# Patient Record
Sex: FEMALE | Race: WHITE | Employment: OTHER | URBAN - METROPOLITAN AREA
[De-identification: names, ages, dates, MRNs, and addresses within clinical notes are randomized per-mention and may not be internally consistent; named-entity substitution may affect disease eponyms.]

---

## 2017-01-03 ENCOUNTER — TELEPHONE (OUTPATIENT)
Dept: ENDOCRINOLOGY | Age: 77
End: 2017-01-03

## 2017-03-13 ENCOUNTER — TELEPHONE (OUTPATIENT)
Dept: ENDOCRINOLOGY | Age: 77
End: 2017-03-13

## 2017-03-13 DIAGNOSIS — R73.01 IFG (IMPAIRED FASTING GLUCOSE): Primary | ICD-10-CM

## 2017-03-13 DIAGNOSIS — Z86.39 HISTORY OF THYROIDITIS: ICD-10-CM

## 2017-03-13 DIAGNOSIS — E61.1 IRON DEFICIENCY: ICD-10-CM

## 2017-03-13 DIAGNOSIS — E55.9 VITAMIN D DEFICIENCY: ICD-10-CM

## 2017-04-07 ENCOUNTER — HOSPITAL ENCOUNTER (OUTPATIENT)
Dept: VASCULAR LAB | Age: 77
Discharge: HOME | End: 2017-04-07
Attending: INTERNAL MEDICINE | Admitting: INTERNAL MEDICINE

## 2017-04-07 DIAGNOSIS — Z86.39 HISTORY OF THYROIDITIS: ICD-10-CM

## 2017-04-07 DIAGNOSIS — Z86.39 PERSONAL HISTORY OF OTHER ENDOCRINE, NUTRITIONAL AND METABOLIC DISEASE: ICD-10-CM

## 2017-04-07 DIAGNOSIS — R42 DIZZINESS: Primary | ICD-10-CM

## 2017-05-01 ENCOUNTER — OFFICE VISIT (OUTPATIENT)
Dept: ENDOCRINOLOGY | Age: 77
End: 2017-05-01

## 2017-05-01 VITALS
SYSTOLIC BLOOD PRESSURE: 118 MMHG | HEART RATE: 73 BPM | BODY MASS INDEX: 25.98 KG/M2 | HEIGHT: 62 IN | DIASTOLIC BLOOD PRESSURE: 68 MMHG | OXYGEN SATURATION: 96 % | WEIGHT: 141.2 LBS

## 2017-05-01 DIAGNOSIS — E04.1 THYROID NODULE: ICD-10-CM

## 2017-05-01 DIAGNOSIS — E78.2 MIXED HYPERLIPIDEMIA: ICD-10-CM

## 2017-05-01 DIAGNOSIS — K82.4 GALLBLADDER POLYP: ICD-10-CM

## 2017-05-01 DIAGNOSIS — E78.00 PURE HYPERCHOLESTEROLEMIA: ICD-10-CM

## 2017-05-01 DIAGNOSIS — E05.90 HYPERTHYROIDISM: Primary | ICD-10-CM

## 2017-05-01 DIAGNOSIS — R73.01 IFG (IMPAIRED FASTING GLUCOSE): ICD-10-CM

## 2017-05-01 DIAGNOSIS — E55.9 VITAMIN D DEFICIENCY: ICD-10-CM

## 2017-05-01 LAB
ANTI-THYROGLOB ABS: 154 IU/ML
T3 FREE: 5.7 PG/ML (ref 2.3–4.2)
T4 FREE: 2.2 NG/DL (ref 0.9–1.8)
THYROID PEROXIDASE (TPO) ABS: 8 IU/ML

## 2017-05-01 PROCEDURE — 99215 OFFICE O/P EST HI 40 MIN: CPT | Performed by: INTERNAL MEDICINE

## 2017-05-01 RX ORDER — PROPRANOLOL HYDROCHLORIDE 10 MG/1
10 TABLET ORAL 3 TIMES DAILY
Qty: 90 TABLET | Refills: 3 | Status: SHIPPED | OUTPATIENT
Start: 2017-05-01 | End: 2017-06-16 | Stop reason: SDUPTHER

## 2017-05-01 ASSESSMENT — PATIENT HEALTH QUESTIONNAIRE - PHQ9
1. LITTLE INTEREST OR PLEASURE IN DOING THINGS: 0
SUM OF ALL RESPONSES TO PHQ QUESTIONS 1-9: 0
2. FEELING DOWN, DEPRESSED OR HOPELESS: 0
SUM OF ALL RESPONSES TO PHQ9 QUESTIONS 1 & 2: 0

## 2017-05-02 LAB — SEDIMENTATION RATE, ERYTHROCYTE: 23 MM/HR (ref 0–30)

## 2017-05-03 LAB — TSH RECEPTOR AB: 2.56 IU/L

## 2017-05-04 LAB — THYROID STIMULATING IMMUNOGLOBULIN: 260 %

## 2017-05-15 ENCOUNTER — TELEPHONE (OUTPATIENT)
Dept: ENDOCRINOLOGY | Age: 77
End: 2017-05-15

## 2017-05-15 DIAGNOSIS — E05.90 HYPERTHYROIDISM: Primary | ICD-10-CM

## 2017-05-17 ENCOUNTER — TELEPHONE (OUTPATIENT)
Dept: INTERNAL MEDICINE | Age: 77
End: 2017-05-17

## 2017-05-17 RX ORDER — BISOPROLOL FUMARATE AND HYDROCHLOROTHIAZIDE 2.5; 6.25 MG/1; MG/1
TABLET ORAL
Qty: 60 TABLET | Refills: 2 | Status: SHIPPED | OUTPATIENT
Start: 2017-05-17 | End: 2017-10-25 | Stop reason: SDUPTHER

## 2017-05-17 RX ORDER — BISOPROLOL FUMARATE AND HYDROCHLOROTHIAZIDE 2.5; 6.25 MG/1; MG/1
TABLET ORAL
Qty: 60 TABLET | Refills: 2 | Status: SHIPPED | OUTPATIENT
Start: 2017-05-17 | End: 2017-05-17 | Stop reason: SDUPTHER

## 2017-05-22 ENCOUNTER — OFFICE VISIT (OUTPATIENT)
Dept: INTERNAL MEDICINE | Age: 77
End: 2017-05-22

## 2017-05-22 VITALS
HEIGHT: 62 IN | SYSTOLIC BLOOD PRESSURE: 120 MMHG | WEIGHT: 139 LBS | BODY MASS INDEX: 25.58 KG/M2 | DIASTOLIC BLOOD PRESSURE: 62 MMHG

## 2017-05-22 DIAGNOSIS — E78.2 MIXED HYPERLIPIDEMIA: ICD-10-CM

## 2017-05-22 DIAGNOSIS — E05.00 GRAVES DISEASE: ICD-10-CM

## 2017-05-22 DIAGNOSIS — Z00.00 WELL ADULT EXAM: Primary | ICD-10-CM

## 2017-05-22 DIAGNOSIS — H40.9 GLAUCOMA, UNSPECIFIED GLAUCOMA, UNSPECIFIED LATERALITY: ICD-10-CM

## 2017-05-22 DIAGNOSIS — Z86.39 HISTORY OF THYROIDITIS: ICD-10-CM

## 2017-05-22 DIAGNOSIS — I10 ESSENTIAL HYPERTENSION: ICD-10-CM

## 2017-05-22 PROCEDURE — G0439 PPPS, SUBSEQ VISIT: HCPCS | Performed by: INTERNAL MEDICINE

## 2017-05-22 PROCEDURE — 93000 ELECTROCARDIOGRAM COMPLETE: CPT | Performed by: INTERNAL MEDICINE

## 2017-05-24 ENCOUNTER — TELEPHONE (OUTPATIENT)
Dept: ENDOCRINOLOGY | Age: 77
End: 2017-05-24

## 2017-05-24 DIAGNOSIS — E05.00 GRAVES DISEASE: Primary | ICD-10-CM

## 2017-06-05 ENCOUNTER — TELEPHONE (OUTPATIENT)
Dept: INTERNAL MEDICINE | Age: 77
End: 2017-06-05

## 2017-06-05 RX ORDER — ERGOCALCIFEROL 1.25 MG/1
CAPSULE ORAL
Qty: 4 CAPSULE | Refills: 2 | Status: SHIPPED | OUTPATIENT
Start: 2017-06-05

## 2017-06-12 RX ORDER — METHIMAZOLE 10 MG/1
10 TABLET ORAL 2 TIMES DAILY
Qty: 60 TABLET | Refills: 3 | Status: SHIPPED | OUTPATIENT
Start: 2017-06-12 | End: 2017-07-12 | Stop reason: DRUGHIGH

## 2017-06-14 ENCOUNTER — TELEPHONE (OUTPATIENT)
Dept: INTERNAL MEDICINE | Age: 77
End: 2017-06-14

## 2017-06-16 ENCOUNTER — TELEPHONE (OUTPATIENT)
Dept: ENDOCRINOLOGY | Age: 77
End: 2017-06-16

## 2017-06-16 DIAGNOSIS — I49.9 IRREGULAR HEART RATE: Primary | ICD-10-CM

## 2017-06-16 RX ORDER — PROPRANOLOL HYDROCHLORIDE 10 MG/1
10 TABLET ORAL 3 TIMES DAILY
Qty: 90 TABLET | Refills: 3 | Status: SHIPPED | OUTPATIENT
Start: 2017-06-16 | End: 2017-06-30

## 2017-06-29 ENCOUNTER — TELEPHONE (OUTPATIENT)
Dept: ENDOCRINOLOGY | Age: 77
End: 2017-06-29

## 2017-06-30 ENCOUNTER — OFFICE VISIT (OUTPATIENT)
Dept: CARDIOLOGY CLINIC | Age: 77
End: 2017-06-30

## 2017-06-30 VITALS
SYSTOLIC BLOOD PRESSURE: 138 MMHG | HEART RATE: 74 BPM | WEIGHT: 136.4 LBS | DIASTOLIC BLOOD PRESSURE: 54 MMHG | BODY MASS INDEX: 24.95 KG/M2

## 2017-06-30 DIAGNOSIS — I10 ESSENTIAL HYPERTENSION: ICD-10-CM

## 2017-06-30 DIAGNOSIS — R00.2 PALPITATIONS: Primary | ICD-10-CM

## 2017-06-30 DIAGNOSIS — R07.9 CHEST PAIN, UNSPECIFIED TYPE: ICD-10-CM

## 2017-06-30 DIAGNOSIS — E78.5 HYPERLIPIDEMIA, UNSPECIFIED HYPERLIPIDEMIA TYPE: ICD-10-CM

## 2017-06-30 PROCEDURE — 99205 OFFICE O/P NEW HI 60 MIN: CPT | Performed by: INTERNAL MEDICINE

## 2017-06-30 PROCEDURE — 93000 ELECTROCARDIOGRAM COMPLETE: CPT | Performed by: INTERNAL MEDICINE

## 2017-06-30 RX ORDER — ATORVASTATIN CALCIUM 20 MG/1
10 TABLET, FILM COATED ORAL DAILY
Qty: 30 TABLET | Refills: 3 | Status: ON HOLD | OUTPATIENT
Start: 2017-06-30 | End: 2021-03-30 | Stop reason: ALTCHOICE

## 2017-06-30 RX ORDER — ASPIRIN 81 MG/1
81 TABLET ORAL DAILY
Qty: 30 TABLET | Refills: 3 | Status: SHIPPED | OUTPATIENT
Start: 2017-06-30 | End: 2019-03-08 | Stop reason: CLARIF

## 2017-07-12 ENCOUNTER — TELEPHONE (OUTPATIENT)
Dept: ENDOCRINOLOGY | Age: 77
End: 2017-07-12

## 2017-07-12 RX ORDER — METHIMAZOLE 10 MG/1
5 TABLET ORAL DAILY
Qty: 15 TABLET | Refills: 5 | Status: SHIPPED | OUTPATIENT
Start: 2017-07-12 | End: 2017-10-08 | Stop reason: ALTCHOICE

## 2017-10-25 ENCOUNTER — TELEPHONE (OUTPATIENT)
Dept: INTERNAL MEDICINE | Age: 77
End: 2017-10-25

## 2017-10-25 RX ORDER — BISOPROLOL FUMARATE AND HYDROCHLOROTHIAZIDE 2.5; 6.25 MG/1; MG/1
TABLET ORAL
Qty: 60 TABLET | Refills: 2 | Status: SHIPPED | OUTPATIENT
Start: 2017-10-25 | End: 2018-01-06 | Stop reason: SDUPTHER

## 2017-10-25 NOTE — TELEPHONE ENCOUNTER
Pt called needs RX for bisoprolol-hydrochlorothiazide (ZIAC) 2.5-6.25 MG per tablet Sent to . .. Pharm on file . .. Advised Pt to check with Pharm in 24 hours . ..  Pls send    Pt only has 3 tablets left- Medication is pending- Disregard is already addressed

## 2017-11-22 ENCOUNTER — OFFICE VISIT (OUTPATIENT)
Dept: INTERNAL MEDICINE | Age: 77
End: 2017-11-22

## 2017-11-22 VITALS
DIASTOLIC BLOOD PRESSURE: 60 MMHG | WEIGHT: 143 LBS | BODY MASS INDEX: 26.31 KG/M2 | SYSTOLIC BLOOD PRESSURE: 122 MMHG | HEIGHT: 62 IN

## 2017-11-22 DIAGNOSIS — E78.2 MIXED HYPERLIPIDEMIA: ICD-10-CM

## 2017-11-22 DIAGNOSIS — E05.00 GRAVES DISEASE: ICD-10-CM

## 2017-11-22 DIAGNOSIS — I10 ESSENTIAL HYPERTENSION: Primary | ICD-10-CM

## 2017-11-22 PROCEDURE — 99213 OFFICE O/P EST LOW 20 MIN: CPT | Performed by: INTERNAL MEDICINE

## 2017-11-22 RX ORDER — DICLOFENAC SODIUM 75 MG/1
TABLET, DELAYED RELEASE ORAL
Qty: 60 TABLET | Refills: 2 | Status: SHIPPED | OUTPATIENT
Start: 2017-11-22 | End: 2019-06-01 | Stop reason: SDUPTHER

## 2017-11-22 NOTE — PROGRESS NOTES
Follow Up Visit  Established Patient Visit    Patient:  Marlen Sherwood                                               : 1940  Age: 68 y.o. MRN: [id number]  Date : 2017      CHIEF COMPLAINT: Marlen Sherwood is a 68 y.o. female who presents for:  6month f/u    HTN- does not check BP. No issues taking medications.     L eye glaucoma- follows w/ ophtho every 6 months- due for visit next visit. Graves disease being closely followed by Dr. Clara Hadley. Last saw 12 days ago and adjusted methimazole dose. Has been difficult to control. Denies heat intolerance. Only endorses occasional palpitations and nausea as her only sx. Pt denies F/C, night sweats, cough, SOB, CP, vomitting, abdominal pain, constipation/diarrhea, urinary sx, loss of coordination/balance, numbness/tingling, sick contacts, or as having any other complaints.     (Here w/ son in law acting as .)    Patient Active Problem List    Diagnosis Date Noted   Reford Hatter disease 2017    Iron deficiency     Chronic diarrhea 2015    Thalassemia minor     Hyperlipidemia 2014    IFG (impaired fasting glucose)     Vitamin D deficiency 2014    Glaucoma 04/15/2014    History of thyroiditis 2012    Liver cyst 2012    HTN (hypertension) 2012    Breast cancer ([facility] [address] 2012    H/O: hysterectomy 2012    DJD (degenerative joint disease) 2012       Constitutional:  Denies fever or chills   Eyes:  Denies change in visual acuity   HENT:  Denies nasal congestion or sore throat   Respiratory:  Denies cough or shortness of breath   Cardiovascular:  Denies chest pain or edema   GI:  Denies abdominal pain, nausea, vomiting, bloody stools or diarrhea   :  Denies dysuria   Musculoskeletal:  Denies back pain or joint pain   Integument:  Denies rash   Neurologic:  Denies headache, focal weakness or sensory changes   Endocrine:  Denies polyuria or polydipsia   Lymphatic:  Denies swollen glands   Psychiatric:  Denies depression or anxiety     Past Medical History:        Diagnosis Date    Breast cancer ([address] 2/14/2012    Diarrhea 7/6/2012    Glaucoma 4/15/2014    H/O: hysterectomy 2/14/2012    History of thyroiditis 08/08/2012    HTN (hypertension) 2/14/2012    Hypothyroidism     IFG (impaired fasting glucose)     Iron deficiency     Liver cyst 8/7/2012    Thalassemia minor     Tonsil stone     Vitamin D deficiency 4/18/2014       Past Surgical History:        Procedure Laterality Date    BREAST SURGERY      HYSTERECTOMY           Allergies:  Latex and Ace inhibitors    Current Medications:    Prior to Admission medications    Medication Sig Start Date End Date Taking? Authorizing Provider   diclofenac (VOLTAREN) 75 MG EC tablet TAKE ONE TABLET BY MOUTH TWICE DAILY WITH MEALS 11/22/17  Yes Clifford Torres MD   bisoprolol-hydrochlorothiazide [facility]) 2.5-6.25 MG per tablet TAKE ONE TABLET BY MOUTH TWICE DAILY 10/25/17  Yes Clifford Torres MD   methimazole (TAPAZOLE) 10 MG tablet Take 0.5 tablets by mouth daily 7/12/17 7/12/18 Yes Dee Dee Hightower MD   aspirin EC 81 MG EC tablet Take 1 tablet by mouth daily 6/30/17  Yes Edyta Garcia MD   atorvastatin (LIPITOR) 20 MG tablet Take 0.5 tablets by mouth daily 6/30/17  Yes Edyta Garcia MD   vitamin D (ERGOCALCIFEROL) 49257 UNITS CAPS capsule TAKE ONE CAPSULE BY MOUTH ONCE WEEKLY 6/5/17  Yes AUTUMN Clancy           Physical Exam:      Constitutional:  Well developed, well nourished, no acute distress, non-toxic appearance   Eyes:  PERRL, conjunctiva normal   HENT:  Atraumatic, external ears normal, nose normal, oropharynx moist, no pharyngeal exudates.  Neck- normal range of motion, no tenderness, supple   Respiratory:  No respiratory distress, normal breath sounds, no rales, no wheezing   Cardiovascular:  Normal rate, normal rhythm, no murmurs, no gallops, no rubs   GI:  Soft, nondistended, normal bowel sounds, nontender, no organomegaly, no mass, no rebound, no guarding   :  No costovertebral angle tenderness   Musculoskeletal:  No edema, no tenderness, no deformities. Back- no tenderness  Integument:  Well hydrated, no rash   Lymphatic:  No lymphadenopathy noted   Neurologic:  Alert & oriented x 3, CN 2-12 normal, normal motor function, normal sensory function, no focal deficits noted   Psychiatric:  Speech and behavior appropriate       Vitals: /60   Ht 5' 2\" (1.575 m)   Wt 143 lb (64.9 kg)   BMI 26.16 kg/m²     Body mass index is 26.16 kg/m². Wt Readings from Last 3 Encounters:   11/22/17 143 lb (64.9 kg)   06/30/17 136 lb 6.4 oz (61.9 kg)   05/22/17 139 lb (63 kg)         LABS:    CBC:   Lab Results   Component Value Date    WBC 8.2 10/11/2017    HGB 13.7 10/11/2017    HCT 40.7 10/11/2017    MCV 79.1 (L) 10/11/2017     10/11/2017           Lab Results   Component Value Date    IRON 67 04/18/2017    TIBC 402 03/27/2014    FERRITIN 15.3 04/18/2017    IGOVTESF06 318 10/11/2016                                                             BMP:    Lab Results   Component Value Date     10/11/2017    K 4.4 10/11/2017     10/11/2017    CO2 29 10/11/2017       LFT's:   Lab Results   Component Value Date    ALT 19 10/11/2017    AST 21 10/11/2017    ALKPHOS 123 10/11/2017    BILITOT 1.2 10/11/2017       Lipids:   Lab Results   Component Value Date    CHOL 196 04/18/2017    HDL 40 (L) 04/18/2017    LDLCALC 117 04/18/2017    TRIG 194 (H) 04/18/2017       INR: No results found for: INR, PROTIME    U/A:No results found for: LABMICR, CYSE45FSV       Lab Results   Component Value Date    LABA1C 6.2 04/18/2017        Lab Results   Component Value Date    CREATININE 1.01 10/11/2017       -----------------------------------------------------------------       Assessment/Plan:   There are no diagnoses linked to this encounter.      Essential hypertension (122/60 11/22/17 stable)   -Cont Ziac (bisoprolol/hctz)    Graves ds/

## 2017-11-22 NOTE — LETTER
[facility] [address]  [address], [address] [place] [id number]  Phone: [phone]  Fax: [phone]    Saul Mena MD        November 22, 2017     Patient: Marlen Sherwood   YOB: 1940   Date of Visit: 11/22/2017       To Whom It May Concern: It is my medical opinion that Marlen Sherwood requires a disability parking placard for the following reasons:  She cannot walk 200 feet without stopping to rest.  Duration of need: 5 years    If you have any questions or concerns, please don't hesitate to call.     Sincerely,        Saul Mena MD

## 2018-01-08 ENCOUNTER — TELEPHONE (OUTPATIENT)
Dept: INTERNAL MEDICINE | Age: 78
End: 2018-01-08

## 2018-01-08 RX ORDER — BISOPROLOL FUMARATE AND HYDROCHLOROTHIAZIDE 2.5; 6.25 MG/1; MG/1
TABLET ORAL
Qty: 180 TABLET | Refills: 0 | Status: SHIPPED | OUTPATIENT
Start: 2018-01-08 | End: 2018-02-09 | Stop reason: SDUPTHER

## 2018-01-08 NOTE — TELEPHONE ENCOUNTER
Pt daughter stated  that  Pt BP is running high. Pt daughter stated that the top number running 190/90 on 1/7/18. Pt daughter needs to be advised on what to do. Pt daughter was not clear on what BP was.   Please advised

## 2018-01-10 ENCOUNTER — TELEPHONE (OUTPATIENT)
Dept: INTERNAL MEDICINE | Age: 78
End: 2018-01-10

## 2018-01-12 ENCOUNTER — OFFICE VISIT (OUTPATIENT)
Dept: INTERNAL MEDICINE | Age: 78
End: 2018-01-12

## 2018-01-12 VITALS
SYSTOLIC BLOOD PRESSURE: 130 MMHG | BODY MASS INDEX: 26.31 KG/M2 | DIASTOLIC BLOOD PRESSURE: 80 MMHG | WEIGHT: 143 LBS | HEIGHT: 62 IN

## 2018-01-12 DIAGNOSIS — I10 ESSENTIAL HYPERTENSION: Primary | ICD-10-CM

## 2018-01-12 PROCEDURE — 99213 OFFICE O/P EST LOW 20 MIN: CPT | Performed by: INTERNAL MEDICINE

## 2018-02-09 ENCOUNTER — OFFICE VISIT (OUTPATIENT)
Dept: INTERNAL MEDICINE | Age: 78
End: 2018-02-09

## 2018-02-09 VITALS
BODY MASS INDEX: 26.87 KG/M2 | OXYGEN SATURATION: 97 % | WEIGHT: 146 LBS | DIASTOLIC BLOOD PRESSURE: 66 MMHG | HEIGHT: 62 IN | SYSTOLIC BLOOD PRESSURE: 122 MMHG | HEART RATE: 74 BPM

## 2018-02-09 DIAGNOSIS — I10 ESSENTIAL HYPERTENSION: Primary | ICD-10-CM

## 2018-02-09 PROCEDURE — 99213 OFFICE O/P EST LOW 20 MIN: CPT | Performed by: INTERNAL MEDICINE

## 2018-02-09 RX ORDER — BISOPROLOL FUMARATE AND HYDROCHLOROTHIAZIDE 2.5; 6.25 MG/1; MG/1
TABLET ORAL
Qty: 180 TABLET | Refills: 1 | Status: SHIPPED | OUTPATIENT
Start: 2018-02-09 | End: 2018-07-30 | Stop reason: SDUPTHER

## 2018-05-22 ENCOUNTER — OFFICE VISIT (OUTPATIENT)
Dept: INTERNAL MEDICINE | Age: 78
End: 2018-05-22

## 2018-05-22 VITALS
SYSTOLIC BLOOD PRESSURE: 110 MMHG | WEIGHT: 143 LBS | DIASTOLIC BLOOD PRESSURE: 78 MMHG | HEIGHT: 62 IN | BODY MASS INDEX: 26.31 KG/M2

## 2018-05-22 DIAGNOSIS — E55.9 VITAMIN D DEFICIENCY: ICD-10-CM

## 2018-05-22 DIAGNOSIS — R42 VERTIGO: ICD-10-CM

## 2018-05-22 DIAGNOSIS — M19.90 OSTEOARTHRITIS, UNSPECIFIED OSTEOARTHRITIS TYPE, UNSPECIFIED SITE: ICD-10-CM

## 2018-05-22 DIAGNOSIS — E05.00 GRAVES DISEASE: ICD-10-CM

## 2018-05-22 DIAGNOSIS — Z00.00 WELL ADULT EXAM: Primary | ICD-10-CM

## 2018-05-22 DIAGNOSIS — I10 ESSENTIAL HYPERTENSION: ICD-10-CM

## 2018-05-22 PROCEDURE — 93000 ELECTROCARDIOGRAM COMPLETE: CPT | Performed by: INTERNAL MEDICINE

## 2018-05-22 PROCEDURE — 99397 PER PM REEVAL EST PAT 65+ YR: CPT | Performed by: INTERNAL MEDICINE

## 2018-05-22 RX ORDER — MECLIZINE HCL 12.5 MG/1
12.5 TABLET ORAL 3 TIMES DAILY PRN
Qty: 30 TABLET | Refills: 1 | Status: SHIPPED | OUTPATIENT
Start: 2018-05-22 | End: 2018-06-21

## 2018-05-22 ASSESSMENT — PATIENT HEALTH QUESTIONNAIRE - PHQ9
SUM OF ALL RESPONSES TO PHQ9 QUESTIONS 1 & 2: 0
1. LITTLE INTEREST OR PLEASURE IN DOING THINGS: 0
SUM OF ALL RESPONSES TO PHQ QUESTIONS 1-9: 0
2. FEELING DOWN, DEPRESSED OR HOPELESS: 0

## 2018-07-30 ENCOUNTER — TELEPHONE (OUTPATIENT)
Dept: INTERNAL MEDICINE | Age: 78
End: 2018-07-30

## 2018-07-30 DIAGNOSIS — I10 ESSENTIAL HYPERTENSION: ICD-10-CM

## 2018-07-30 RX ORDER — BISOPROLOL FUMARATE AND HYDROCHLOROTHIAZIDE 2.5; 6.25 MG/1; MG/1
TABLET ORAL
Qty: 180 TABLET | Refills: 3 | Status: SHIPPED | OUTPATIENT
Start: 2018-07-30 | End: 2019-04-22 | Stop reason: SDUPTHER

## 2018-07-30 NOTE — TELEPHONE ENCOUNTER
Patient has appt scheduled 8/22 and needs her refill to be sent to the pharmacy.   [address] [place] [address] [id number]   [phone]

## 2018-08-22 ENCOUNTER — OFFICE VISIT (OUTPATIENT)
Dept: INTERNAL MEDICINE | Age: 78
End: 2018-08-22

## 2018-08-22 VITALS
WEIGHT: 141 LBS | BODY MASS INDEX: 25.95 KG/M2 | SYSTOLIC BLOOD PRESSURE: 130 MMHG | HEIGHT: 62 IN | DIASTOLIC BLOOD PRESSURE: 68 MMHG

## 2018-08-22 DIAGNOSIS — E05.00 GRAVES DISEASE: ICD-10-CM

## 2018-08-22 DIAGNOSIS — E55.9 VITAMIN D INSUFFICIENCY: ICD-10-CM

## 2018-08-22 DIAGNOSIS — F41.0 PANIC ATTACKS: ICD-10-CM

## 2018-08-22 DIAGNOSIS — E78.5 HYPERLIPIDEMIA, UNSPECIFIED HYPERLIPIDEMIA TYPE: Chronic | ICD-10-CM

## 2018-08-22 DIAGNOSIS — I10 ESSENTIAL HYPERTENSION: Primary | ICD-10-CM

## 2018-08-22 PROCEDURE — 99213 OFFICE O/P EST LOW 20 MIN: CPT | Performed by: INTERNAL MEDICINE

## 2018-08-22 NOTE — PROGRESS NOTES
Follow Up Visit  Established Patient Visit    Patient:  Alfred Dawkins                                               : 1940  Age: 66 y.o. MRN: [id number]  Date : 2018      CHIEF COMPLAINT: Alrfed Dawkins is a 66 y.o. female who presents for :  Follow up     Chief Complaint   Patient presents with    Hypertension     Patient reports she is filling \"okay\". Patient has question about taking larger dose of vitamin D. Patient BP is well controlled, 130/68 in clinic. She checks her BP at home and reports it is usually well controlled (120-130s/60s). She would like to discuss her lab results. She follows with Dr. Jessica Schroeder for grave's disease. It is currently well controlled. She has some panic attacks when she goes outside, no specific triggers.      Past Medical History:   Diagnosis Date    Breast cancer ([address] 2012    Diarrhea 2012    Glaucoma 4/15/2014    H/O: hysterectomy 2012    History of thyroiditis 2012    HTN (hypertension) 2012    Hypothyroidism     IFG (impaired fasting glucose)     Iron deficiency     Liver cyst 2012    Thalassemia minor     Tonsil stone     Vertigo 2018    Vitamin D deficiency 2014       Past Surgical History:   Procedure Laterality Date    BREAST SURGERY      HYSTERECTOMY         Current Outpatient Prescriptions   Medication Sig Dispense Refill    bisoprolol-hydrochlorothiazide (ZIAC) 2.5-6.25 MG per tablet TAKE ONE TABLET BY MOUTH TWICE DAILY 180 tablet 3    Blood Pressure Monitoring (BLOOD PRESSURE KIT) ROSA Check blood pressure once daily 1 Device 0    diclofenac (VOLTAREN) 75 MG EC tablet TAKE ONE TABLET BY MOUTH TWICE DAILY WITH MEALS 60 tablet 2    aspirin EC 81 MG EC tablet Take 1 tablet by mouth daily 30 tablet 3    atorvastatin (LIPITOR) 20 MG tablet Take 0.5 tablets by mouth daily 30 tablet 3    vitamin D (ERGOCALCIFEROL) 32399 UNITS CAPS capsule TAKE ONE CAPSULE BY MOUTH ONCE WEEKLY 4 capsule 2     No current facility-administered medications for this visit. Physical Exam   /68 (Site: Left Arm)   Ht 5' 2\" (1.575 m)   Wt 141 lb (64 kg)   BMI 25.79 kg/m²     Physical Exam   Constitutional: She is oriented to person, place, and time. She appears well-developed and well-nourished. HENT:   Head: Normocephalic and atraumatic. Eyes: Pupils are equal, round, and reactive to light. EOM are normal.   Neck: Normal range of motion. No JVD present. Cardiovascular: Normal rate, regular rhythm, normal heart sounds and intact distal pulses. Pulmonary/Chest: Effort normal and breath sounds normal. No stridor. No respiratory distress. She has no wheezes. She has no rales. Abdominal: Soft. Bowel sounds are normal. She exhibits no distension. There is no tenderness. Musculoskeletal: Normal range of motion. She exhibits no edema. Neurological: She is alert and oriented to person, place, and time. No cranial nerve deficit. Skin: Skin is warm and dry. Psychiatric: She has a normal mood and affect. Her behavior is normal. Judgment and thought content normal.       Assessment/ plan:     Carroll Ellis was seen today for hypertension. Diagnoses and all orders for this visit:    Essential hypertension  Controlled on current medication.         -     Cont Ziac (2.5-6.25)  -     CBC Auto Differential; Future  -     COMPREHENSIVE METABOLIC PANEL; Future    Panic attacks  -     External Referral To Psychology    Hyperlipidemia, unspecified hyperlipidemia type        - Does not want to be on statin. Provided dietary instructions. Graves disease        - Currently euthroid. Follows with Dr. Delonte Saab. Vitamin D insufficiency         - Take 2000 units daily        Rachel Altamirano, [address]

## 2018-09-04 LAB
ALBUMIN SERPL-MCNC: 4.2 G/DL (ref 3.5–5.7)
ALP BLD-CCNC: 88 U/L (ref 36–125)
ALT SERPL-CCNC: 13 U/L (ref 7–52)
ANION GAP SERPL CALCULATED.3IONS-SCNC: 10 MMOL/L (ref 3–16)
AST SERPL-CCNC: 18 U/L (ref 13–39)
BASOPHILS ABSOLUTE: 53 /UL (ref 0–200)
BASOPHILS RELATIVE PERCENT: 0.7 % (ref 0–1)
BILIRUB SERPL-MCNC: 1.3 MG/DL (ref 0–1.5)
BUN BLDV-MCNC: 20 MG/DL (ref 7–25)
CALCIUM SERPL-MCNC: 9.9 MG/DL (ref 8.6–10.3)
CHLORIDE BLD-SCNC: 102 MMOL/L (ref 98–110)
CO2: 29 MMOL/L (ref 21–33)
CREAT SERPL-MCNC: 0.92 MG/DL (ref 0.6–1.3)
EOSINOPHILS ABSOLUTE: 263 /UL (ref 15–500)
EOSINOPHILS RELATIVE PERCENT: 3.5 % (ref 0–8)
GFR, ESTIMATED: 60 SEE NOTE.
GFR, ESTIMATED: 69 SEE NOTE.
GLUCOSE BLD-MCNC: 101 MG/DL (ref 70–100)
HCT VFR BLD CALC: 42.4 % (ref 35–45)
HEMOGLOBIN: 13.8 G/DL (ref 11.7–15.5)
LYMPHOCYTES ABSOLUTE: 2918 /UL (ref 850–3900)
LYMPHOCYTES RELATIVE PERCENT: 38.9 % (ref 15–45)
MCH RBC QN AUTO: 26.2 PG (ref 27–33)
MCHC RBC AUTO-ENTMCNC: 32.5 G/DL (ref 32–36)
MCV RBC AUTO: 80.7 FL (ref 80–100)
MONOCYTES ABSOLUTE: 818 /UL (ref 200–950)
MONOCYTES RELATIVE PERCENT: 10.9 % (ref 0–12)
NEUTROPHILS ABSOLUTE: 3450 /UL (ref 1500–7800)
NUCLEATED RED BLOOD CELLS: 0 /100 WBC (ref 0–0)
OSMOLALITY CALCULATION: 295 MOSM/KG (ref 278–305)
PDW BLD-RTO: 15.1 % (ref 11–15)
PLATELET # BLD: 245 10E3/UL (ref 140–400)
PMV BLD AUTO: 9.4 FL (ref 7.5–11.5)
POTASSIUM SERPL-SCNC: 4.1 MMOL/L (ref 3.5–5.3)
RBC # BLD: 5.25 10E6/UL (ref 3.8–5.1)
SEGMENTED NEUTROPHILS RELATIVE PERCENT: 46 % (ref 40–80)
SODIUM BLD-SCNC: 141 MMOL/L (ref 133–146)
TOTAL PROTEIN: 7.5 G/DL (ref 6.4–8.9)
WBC # BLD: 7.5 10E3/UL (ref 3.8–10.8)

## 2018-10-04 ENCOUNTER — TELEPHONE (OUTPATIENT)
Dept: INTERNAL MEDICINE CLINIC | Age: 78
End: 2018-10-04

## 2018-10-04 DIAGNOSIS — K52.9 CHRONIC DIARRHEA: Primary | ICD-10-CM

## 2018-10-04 NOTE — TELEPHONE ENCOUNTER
Pt daughter request an updated referral to an gastroenterologist.  Please send to Dr Jose Raul Long offices- Fax [phone].

## 2018-10-05 ENCOUNTER — TELEPHONE (OUTPATIENT)
Dept: INTERNAL MEDICINE CLINIC | Age: 78
End: 2018-10-05

## 2018-11-20 ENCOUNTER — OFFICE VISIT (OUTPATIENT)
Dept: PSYCHOLOGY | Age: 78
End: 2018-11-20
Payer: MEDICAID

## 2018-11-20 DIAGNOSIS — F41.1 GAD (GENERALIZED ANXIETY DISORDER): ICD-10-CM

## 2018-11-20 DIAGNOSIS — F41.0 PANIC DISORDER: ICD-10-CM

## 2018-11-20 PROCEDURE — 90791 PSYCH DIAGNOSTIC EVALUATION: CPT | Performed by: PSYCHOLOGIST

## 2018-11-20 ASSESSMENT — ANXIETY QUESTIONNAIRES
2. NOT BEING ABLE TO STOP OR CONTROL WORRYING: 3-NEARLY EVERY DAY
GAD7 TOTAL SCORE: 13
6. BECOMING EASILY ANNOYED OR IRRITABLE: 3-NEARLY EVERY DAY
1. FEELING NERVOUS, ANXIOUS, OR ON EDGE: 3-NEARLY EVERY DAY
3. WORRYING TOO MUCH ABOUT DIFFERENT THINGS: 3-NEARLY EVERY DAY
4. TROUBLE RELAXING: 1-SEVERAL DAYS
5. BEING SO RESTLESS THAT IT IS HARD TO SIT STILL: 0-NOT AT ALL SURE
7. FEELING AFRAID AS IF SOMETHING AWFUL MIGHT HAPPEN: 0-NOT AT ALL SURE

## 2018-11-20 ASSESSMENT — PATIENT HEALTH QUESTIONNAIRE - PHQ9
7. TROUBLE CONCENTRATING ON THINGS, SUCH AS READING THE NEWSPAPER OR WATCHING TELEVISION: 0
SUM OF ALL RESPONSES TO PHQ9 QUESTIONS 1 & 2: 0
10. IF YOU CHECKED OFF ANY PROBLEMS, HOW DIFFICULT HAVE THESE PROBLEMS MADE IT FOR YOU TO DO YOUR WORK, TAKE CARE OF THINGS AT HOME, OR GET ALONG WITH OTHER PEOPLE: 0
5. POOR APPETITE OR OVEREATING: 3
8. MOVING OR SPEAKING SO SLOWLY THAT OTHER PEOPLE COULD HAVE NOTICED. OR THE OPPOSITE, BEING SO FIGETY OR RESTLESS THAT YOU HAVE BEEN MOVING AROUND A LOT MORE THAN USUAL: 0
4. FEELING TIRED OR HAVING LITTLE ENERGY: 1
6. FEELING BAD ABOUT YOURSELF - OR THAT YOU ARE A FAILURE OR HAVE LET YOURSELF OR YOUR FAMILY DOWN: 0
1. LITTLE INTEREST OR PLEASURE IN DOING THINGS: 0
2. FEELING DOWN, DEPRESSED OR HOPELESS: 0
3. TROUBLE FALLING OR STAYING ASLEEP: 1
9. THOUGHTS THAT YOU WOULD BE BETTER OFF DEAD, OR OF HURTING YOURSELF: 0
SUM OF ALL RESPONSES TO PHQ QUESTIONS 1-9: 5
SUM OF ALL RESPONSES TO PHQ QUESTIONS 1-9: 5

## 2018-11-20 NOTE — PROGRESS NOTES
instructions Yes  Ability to respond meaningfully Yes  Suicide Assessment    Denies SI      History:    Medications:   Current Outpatient Prescriptions   Medication Sig Dispense Refill    bisoprolol-hydrochlorothiazide (ZIAC) 2.5-6.25 MG per tablet TAKE ONE TABLET BY MOUTH TWICE DAILY 180 tablet 3    Blood Pressure Monitoring (BLOOD PRESSURE KIT) ROSA Check blood pressure once daily 1 Device 0    diclofenac (VOLTAREN) 75 MG EC tablet TAKE ONE TABLET BY MOUTH TWICE DAILY WITH MEALS 60 tablet 2    aspirin EC 81 MG EC tablet Take 1 tablet by mouth daily 30 tablet 3    atorvastatin (LIPITOR) 20 MG tablet Take 0.5 tablets by mouth daily 30 tablet 3    vitamin D (ERGOCALCIFEROL) 58265 UNITS CAPS capsule TAKE ONE CAPSULE BY MOUTH ONCE WEEKLY 4 capsule 2     No current facility-administered medications for this visit. Social History:   Social History     Social History    Marital status:      Spouse name: N/A    Number of children: N/A    Years of education: N/A     Occupational History    Not on file. Social History Main Topics    Smoking status: Never Smoker    Smokeless tobacco: Never Used    Alcohol use No    Drug use: Unknown    Sexual activity: Not on file     Other Topics Concern    Not on file     Social History Narrative    No narrative on file       TOBACCO:   reports that she has never smoked. She has never used smokeless tobacco.  ETOH:   reports that she does not drink alcohol. Family History:   Family History   Problem Relation Age of Onset    Stroke Mother     Heart Disease Father     High Blood Pressure Father          A:  Ms. Jasmine Villagomez has a longstanding history of panic attacks that has become panic disorder. She has become more isolative as she is dependent on going out only with another person. She is fearful of having panic attacks in public if she is on her own. She also endorses generalized anxiety.  Ms. Jasmine Villagomez currently takes phenazapam, a [place] benzodiazapine, about once a month, although she was unsure about the dose. She responded well to psychoeducation about risks of benzodiazepines and evidence based treatment for panic disorder and anxiety. She will return in one week for behavioral interventions. Diagnosis:      Panic Disorder with Agoraphobia  LOYD      Plan:  Pt interventions:  Established rapport, Conducted functional assessment, [place]-setting to identify pt's [facility] / overall [facility], Supportive techniques and Provided Psychoeducation re: anxiety, panic attacks, evidence based treatment, role of medication in treatment, role of benzodiazapines in treatment for panic attacks and anxiety  treatment planning    Pt Behavioral Change Plan:  Pt set goal to return for f/u in one week.

## 2018-12-10 ENCOUNTER — OFFICE VISIT (OUTPATIENT)
Dept: PSYCHOLOGY | Age: 78
End: 2018-12-10
Payer: MEDICAID

## 2018-12-10 DIAGNOSIS — F41.0 PANIC DISORDER: Primary | ICD-10-CM

## 2018-12-10 DIAGNOSIS — F41.1 GAD (GENERALIZED ANXIETY DISORDER): ICD-10-CM

## 2018-12-10 PROCEDURE — 90832 PSYTX W PT 30 MINUTES: CPT | Performed by: PSYCHOLOGIST

## 2018-12-10 ASSESSMENT — ANXIETY QUESTIONNAIRES
6. BECOMING EASILY ANNOYED OR IRRITABLE: 3-NEARLY EVERY DAY
2. NOT BEING ABLE TO STOP OR CONTROL WORRYING: 3-NEARLY EVERY DAY
1. FEELING NERVOUS, ANXIOUS, OR ON EDGE: 3-NEARLY EVERY DAY
3. WORRYING TOO MUCH ABOUT DIFFERENT THINGS: 3-NEARLY EVERY DAY
GAD7 TOTAL SCORE: 18
5. BEING SO RESTLESS THAT IT IS HARD TO SIT STILL: 0-NOT AT ALL SURE
7. FEELING AFRAID AS IF SOMETHING AWFUL MIGHT HAPPEN: 3-NEARLY EVERY DAY
4. TROUBLE RELAXING: 3-NEARLY EVERY DAY

## 2018-12-10 ASSESSMENT — PATIENT HEALTH QUESTIONNAIRE - PHQ9
SUM OF ALL RESPONSES TO PHQ9 QUESTIONS 1 & 2: 0
1. LITTLE INTEREST OR PLEASURE IN DOING THINGS: 0
2. FEELING DOWN, DEPRESSED OR HOPELESS: 0
SUM OF ALL RESPONSES TO PHQ QUESTIONS 1-9: 0
SUM OF ALL RESPONSES TO PHQ QUESTIONS 1-9: 0

## 2018-12-10 NOTE — PATIENT INSTRUCTIONS
\"The entire autonomic nervous system (and through it, our internal organs and glands) is largely driven by our breathing patterns. By changing our breathing we can influence millions of biochemical reactions in our body, producing more relaxing substances such as endorphins and fewer anxiety-producing ones like adrenaline and higher blood acidity. Mindfulness of the breath is so effective that it is common to all meditative and prayer traditions. \" Anxiety Fear & Breathing - Breathing. com    \"When overcoming high levels of anxiety, it is important to learn the techniques of correct breathing. Many people who live with high levels of anxiety are known to breathe through their chest. Shallow breathing through the chest means you are disrupting the balance of oxygen and carbon dioxide necessary to be in a relaxed state. This type of breathing will perpetuate the symptoms of anxiety. \" [employer]. com      Diaphragmatic Breathing  ( You can download the free alan,  [id number], to practice)           _____________________________________________________________________________  1. Sit in a comfortable position    2. Place one hand on your stomach and the other on your chest    3. Try to breathe so that only your stomach rises and falls    As you inhale, concentrate on your chest remaining relatively still while your stomach rises. It may be helpful for you to imagine that your pants are too big and you need to push your stomach out to hold them up. When exhaling, allow your stomach to fall in and the air to fully escape. Inhale slowly. You may choose to hold the air in for about a second. Exhale slowly. Dont push the air out, but just let the natural pressure of your body slowly move it out.     It is normal for this healthy method of breathing to feel a little awkward at first.  With practice, it will feel more natural.    4. Get your mind on your side    One other important factor in getting relaxed is your mind.  Your mind and body are connected. The mind influences the body and the body influences the mind. What you do with your mind when you are trying to relax is very important. The key is to avoid thinking about stressful things. You can think about      Neutral things (e.g., counting, saying a word like calm or relax)   Pleasant things (e.g., imagining a pleasant place)    5. It is recommended that you practice 2 times per day, 10 minutes each time.

## 2018-12-10 NOTE — PROGRESS NOTES
Behavioral Health Consultation  [address] PsyD  Psychologist  12/10/2018  10:32 AM      Time spent with Patient: 30 minutes  This is patient's second [facility] appointment. Reason for Consult:  Anxiety  Referring Provider: Placido Pemberton MD  [address] [facility] [address] [facility]      Feedback given to PCP. S:  Pt seen with daughter, Luz Marina Raymundo. Pt reports she continues to have anxiety and panic symptoms. Today on the drive over got worked up and felt dizzy. Hands get cold too when anxious or having an attack.  Feels anxious all day long but increases at times and isn't always sure why.       O:  MSE:    Appearance    alert, cooperative  Sleep disturbance Yes  Fatigue Yes  Loss of pleasure No  Impulsive behavior No  Speech    normal rate and normal volume  Mood    \"nervous\"  Affect    Congruent to thought content and mood  Thought Content    intact  Thought Process    linear and goal directed  Associations    logical connections  Insight    Fair  Judgment    Intact  Orientation    oriented to person, place, time, and general circumstances  Memory    recent and remote memory intact  Attention/Concentration    intact  Ability to understand instructions Yes  Ability to respond meaningfully Yes  Suicide Assessment    Denies SI      History:    Medications:   Current Outpatient Prescriptions   Medication Sig Dispense Refill    bisoprolol-hydrochlorothiazide (ZIAC) 2.5-6.25 MG per tablet TAKE ONE TABLET BY MOUTH TWICE DAILY 180 tablet 3    Blood Pressure Monitoring (BLOOD PRESSURE KIT) ROSA Check blood pressure once daily 1 Device 0    diclofenac (VOLTAREN) 75 MG EC tablet TAKE ONE TABLET BY MOUTH TWICE DAILY WITH MEALS 60 tablet 2    aspirin EC 81 MG EC tablet Take 1 tablet by mouth daily 30 tablet 3    atorvastatin (LIPITOR) 20 MG tablet Take 0.5 tablets by mouth daily 30 tablet 3    vitamin D (ERGOCALCIFEROL) 07125 UNITS CAPS capsule TAKE ONE CAPSULE BY MOUTH ONCE WEEKLY 4 capsule 2

## 2018-12-17 ENCOUNTER — OFFICE VISIT (OUTPATIENT)
Dept: PSYCHOLOGY | Age: 78
End: 2018-12-17
Payer: MEDICAID

## 2018-12-17 DIAGNOSIS — F41.0 PANIC DISORDER: Primary | ICD-10-CM

## 2018-12-17 DIAGNOSIS — F41.1 GAD (GENERALIZED ANXIETY DISORDER): ICD-10-CM

## 2018-12-17 PROCEDURE — 90832 PSYTX W PT 30 MINUTES: CPT | Performed by: PSYCHOLOGIST

## 2018-12-17 ASSESSMENT — PATIENT HEALTH QUESTIONNAIRE - PHQ9
2. FEELING DOWN, DEPRESSED OR HOPELESS: 0
SUM OF ALL RESPONSES TO PHQ QUESTIONS 1-9: 0
1. LITTLE INTEREST OR PLEASURE IN DOING THINGS: 0
SUM OF ALL RESPONSES TO PHQ QUESTIONS 1-9: 0
SUM OF ALL RESPONSES TO PHQ9 QUESTIONS 1 & 2: 0

## 2019-03-08 ENCOUNTER — OFFICE VISIT (OUTPATIENT)
Dept: INTERNAL MEDICINE CLINIC | Age: 79
End: 2019-03-08
Payer: MEDICAID

## 2019-03-08 VITALS
HEIGHT: 62 IN | SYSTOLIC BLOOD PRESSURE: 110 MMHG | BODY MASS INDEX: 26.13 KG/M2 | DIASTOLIC BLOOD PRESSURE: 64 MMHG | WEIGHT: 142 LBS

## 2019-03-08 DIAGNOSIS — E55.9 VITAMIN D INSUFFICIENCY: ICD-10-CM

## 2019-03-08 DIAGNOSIS — R10.84 GENERALIZED ABDOMINAL PAIN: ICD-10-CM

## 2019-03-08 DIAGNOSIS — R30.0 DYSURIA: ICD-10-CM

## 2019-03-08 DIAGNOSIS — E05.00 GRAVES DISEASE: ICD-10-CM

## 2019-03-08 DIAGNOSIS — E78.2 MIXED HYPERLIPIDEMIA: ICD-10-CM

## 2019-03-08 DIAGNOSIS — I10 ESSENTIAL HYPERTENSION: Primary | ICD-10-CM

## 2019-03-08 DIAGNOSIS — F41.0 PANIC ATTACKS: ICD-10-CM

## 2019-03-08 PROCEDURE — 99213 OFFICE O/P EST LOW 20 MIN: CPT | Performed by: INTERNAL MEDICINE

## 2019-03-08 ASSESSMENT — PATIENT HEALTH QUESTIONNAIRE - PHQ9
1. LITTLE INTEREST OR PLEASURE IN DOING THINGS: 0
SUM OF ALL RESPONSES TO PHQ QUESTIONS 1-9: 0
SUM OF ALL RESPONSES TO PHQ9 QUESTIONS 1 & 2: 0
2. FEELING DOWN, DEPRESSED OR HOPELESS: 0
SUM OF ALL RESPONSES TO PHQ QUESTIONS 1-9: 0

## 2019-04-12 ENCOUNTER — TELEPHONE (OUTPATIENT)
Dept: INTERNAL MEDICINE CLINIC | Age: 79
End: 2019-04-12

## 2019-04-12 DIAGNOSIS — R30.0 DYSURIA: Primary | ICD-10-CM

## 2019-04-12 LAB
ALBUMIN SERPL-MCNC: 4.4 G/DL (ref 3.5–5.7)
ALP BLD-CCNC: 79 U/L (ref 36–125)
ALT SERPL-CCNC: 12 U/L (ref 7–52)
ANION GAP SERPL CALCULATED.3IONS-SCNC: 9 MMOL/L (ref 3–16)
AST SERPL-CCNC: 19 U/L (ref 13–39)
BACTERIA: ABNORMAL /HPF
BASOPHILS ABSOLUTE: 54 /UL (ref 0–200)
BASOPHILS RELATIVE PERCENT: 0.6 % (ref 0–1)
BILIRUB SERPL-MCNC: 1.2 MG/DL (ref 0–1.5)
BILIRUBIN URINE: NEGATIVE
BUN BLDV-MCNC: 31 MG/DL (ref 7–25)
C-REACTIVE PROTEIN WIDE RANGE: 10.1 MG/L (ref 1–10)
CALCIUM SERPL-MCNC: 10.3 MG/DL (ref 8.6–10.3)
CHLORIDE BLD-SCNC: 101 MMOL/L (ref 98–110)
CLARITY: ABNORMAL
CO2: 30 MMOL/L (ref 21–33)
COLOR: YELLOW
CREAT SERPL-MCNC: 1.03 MG/DL (ref 0.6–1.3)
EOSINOPHILS ABSOLUTE: 243 /UL (ref 15–500)
EOSINOPHILS RELATIVE PERCENT: 2.7 % (ref 0–8)
EPITHELIAL CELLS, UA: 23 /HPF (ref 0–5)
ERYTHROCYTES URINE: NEGATIVE
GFR, ESTIMATED: 52 SEE NOTE.
GFR, ESTIMATED: 60 SEE NOTE.
GLUCOSE BLD-MCNC: 109 MG/DL (ref 70–100)
GLUCOSE URINE: NEGATIVE MG/DL
HCT VFR BLD CALC: 43.1 % (ref 35–45)
HEMOGLOBIN: 14 G/DL (ref 11.7–15.5)
KETONES, URINE: NEGATIVE MG/DL
LEUKOCYTE ESTERASE, URINE: ABNORMAL
LEUKOCYTES, UA: 3 /HPF (ref 0–5)
LYMPHOCYTES ABSOLUTE: 2277 /UL (ref 850–3900)
LYMPHOCYTES RELATIVE PERCENT: 25.3 % (ref 15–45)
MCH RBC QN AUTO: 26 PG (ref 27–33)
MCHC RBC AUTO-ENTMCNC: 32.5 G/DL (ref 32–36)
MCV RBC AUTO: 80.2 FL (ref 80–100)
MONOCYTES ABSOLUTE: 621 /UL (ref 200–950)
MONOCYTES RELATIVE PERCENT: 6.9 % (ref 0–12)
MUCUS: PRESENT /HPF
NEUTROPHILS ABSOLUTE: 5805 /UL (ref 1500–7800)
NITRITE, URINE: NEGATIVE
NUCLEATED RED BLOOD CELLS: 0 /100 WBC (ref 0–0)
OSMOLALITY CALCULATION: 297 MOSM/KG (ref 278–305)
PDW BLD-RTO: 14.2 % (ref 11–15)
PLATELET # BLD: 260 10E3/UL (ref 140–400)
PMV BLD AUTO: 10.4 FL (ref 7.5–11.5)
POC PH ARTERIAL: 5 (ref 5–8)
POTASSIUM SERPL-SCNC: 4.3 MMOL/L (ref 3.5–5.3)
PROTEIN UA: NEGATIVE MG/DL
RBC # BLD: 5.38 10E6/UL (ref 3.8–5.1)
RBC UA: 2 /HPF (ref 0–3)
SEDIMENTATION RATE, ERYTHROCYTE: 40 MM/HR (ref 0–30)
SEGMENTED NEUTROPHILS RELATIVE PERCENT: 64.5 % (ref 40–80)
SODIUM BLD-SCNC: 140 MMOL/L (ref 133–146)
SPECIFIC GRAVITY UA: 1.02 (ref 1–1.03)
TOTAL PROTEIN: 7.9 G/DL (ref 6.4–8.9)
UROBILINOGEN, URINE: <2 MG/DL (ref 0.2–1.9)
WBC # BLD: 9 10E3/UL (ref 3.8–10.8)

## 2019-04-12 NOTE — TELEPHONE ENCOUNTER
Please place order for UA C&S. Patients daughter states she is have symptoms of UTI. Please fax order to [phone]. Patients daughter is taking her to [facility] this morning.

## 2019-04-13 LAB — CLINICAL REPORT: NORMAL

## 2019-04-22 DIAGNOSIS — I10 ESSENTIAL HYPERTENSION: ICD-10-CM

## 2019-04-22 RX ORDER — BISOPROLOL FUMARATE AND HYDROCHLOROTHIAZIDE 2.5; 6.25 MG/1; MG/1
TABLET ORAL
Qty: 180 TABLET | Refills: 3 | Status: SHIPPED | OUTPATIENT
Start: 2019-04-22 | End: 2019-05-20 | Stop reason: SDUPTHER

## 2019-05-20 DIAGNOSIS — I10 ESSENTIAL HYPERTENSION: ICD-10-CM

## 2019-05-20 RX ORDER — BISOPROLOL FUMARATE AND HYDROCHLOROTHIAZIDE 2.5; 6.25 MG/1; MG/1
TABLET ORAL
Qty: 180 TABLET | Refills: 3 | Status: SHIPPED | OUTPATIENT
Start: 2019-05-20 | End: 2020-06-01

## 2019-05-20 NOTE — TELEPHONE ENCOUNTER
Refill request:     bisoprolol-hydrochlorothiazide [facility]) 2.5-6.25 MG per tablet [[id number]    [facility] [address], [address] [place] [address] [phone] - F [phone]  [phone]

## 2019-06-03 RX ORDER — DICLOFENAC SODIUM 75 MG/1
TABLET, DELAYED RELEASE ORAL
Qty: 60 TABLET | Refills: 1 | Status: SHIPPED | OUTPATIENT
Start: 2019-06-03 | End: 2020-11-23

## 2019-06-12 ENCOUNTER — OFFICE VISIT (OUTPATIENT)
Dept: INTERNAL MEDICINE CLINIC | Age: 79
End: 2019-06-12
Payer: MEDICAID

## 2019-06-12 VITALS
SYSTOLIC BLOOD PRESSURE: 110 MMHG | DIASTOLIC BLOOD PRESSURE: 68 MMHG | BODY MASS INDEX: 25.21 KG/M2 | HEIGHT: 62 IN | WEIGHT: 137 LBS

## 2019-06-12 DIAGNOSIS — E78.2 MIXED HYPERLIPIDEMIA: ICD-10-CM

## 2019-06-12 DIAGNOSIS — A04.8 H. PYLORI INFECTION: Primary | ICD-10-CM

## 2019-06-12 DIAGNOSIS — I10 ESSENTIAL HYPERTENSION: ICD-10-CM

## 2019-06-12 PROCEDURE — 99213 OFFICE O/P EST LOW 20 MIN: CPT | Performed by: INTERNAL MEDICINE

## 2019-06-12 NOTE — PROGRESS NOTES
Follow Up Visit  Established Patient Visit    Patient:  Jeramy Dickerson                                               : 1940  Age: 78 y.o. MRN: [id number]  Date : 2019      CHIEF COMPLAINT: Jeramy Dickerson is a 78 y.o. female who presents for :      Chief Complaint   Patient presents with    Hypertension    Hyperlipidemia     She has multiple complaints today. She states that she wants to discuss the results of her lab work up that was sent during her last visit. She states that she hasn't been taking her diclofenac because the coating is too much around the pill and she has a hard time swallowing the medicine. She was found to have H pylori stool antigen positive but refused treatment since she felt better. GI recommended EGD and colonoscopy however she refused. Past Medical History:   Diagnosis Date    Breast cancer ([address] 2012    Diarrhea 2012    Glaucoma 4/15/2014    H/O: hysterectomy 2012    History of thyroiditis 2012    HTN (hypertension) 2012    Hypothyroidism     IFG (impaired fasting glucose)     Iron deficiency     Liver cyst 2012    Thalassemia minor     Tonsil stone     Vertigo 2018    Vitamin D deficiency 2014       Past Surgical History:   Procedure Laterality Date    BREAST SURGERY      HYSTERECTOMY         Current Outpatient Medications   Medication Sig Dispense Refill    diclofenac (VOLTAREN) 75 MG EC tablet TAKE ONE TABLET BY MOUTH TWICE A DAY WITH MEALS 60 tablet 1    bisoprolol-hydrochlorothiazide (ZIAC) 2.5-6.25 MG per tablet TAKE ONE TABLET BY MOUTH TWICE DAILY 180 tablet 3    atorvastatin (LIPITOR) 20 MG tablet Take 0.5 tablets by mouth daily 30 tablet 3    vitamin D (ERGOCALCIFEROL) 00982 UNITS CAPS capsule TAKE ONE CAPSULE BY MOUTH ONCE WEEKLY 4 capsule 2     No current facility-administered medications for this visit.         Physical Exam   /68   Ht 5' 2\" (1.575 m)   Wt 137 lb (62.1 kg)   BMI 25.06 kg/m²     Physical Exam   Constitutional: She is oriented to person, place, and time. She appears well-developed and well-nourished. No distress. HENT:   Head: Normocephalic and atraumatic. Eyes: Pupils are equal, round, and reactive to light. EOM are normal.   Cardiovascular: Normal rate, regular rhythm, normal heart sounds and intact distal pulses. Pulmonary/Chest: Effort normal and breath sounds normal.   Abdominal: Soft. Bowel sounds are normal.   Neurological: She is alert and oriented to person, place, and time. Skin: Skin is warm and dry. Capillary refill takes less than 2 seconds. No rash noted. She is not diaphoretic. No erythema. No pallor. Vitals reviewed. Assessment/ plan:     Nelson Somers was seen today for hypertension and hyperlipidemia. Diagnoses and all orders for this visit:    H. pylori infection - stool antigen positive. Did not want to take medicines given that she is not having abdominal pain. Reiterated the same today given the slight risk that triple therapy could give her worsened diarrhea. Spoke at length given the the need for EGD and colonoscopy given her issues. She did not come to a firm decision today but we reinforced the need for EGD and colonoscopy. - encouraged follow up with GI  - ideally should have triple therapy but patient refusing    Essential hypertension  - continue current meds    Mixed hyperlipidemia  - continue current meds       Amber Dietrich, [address]

## 2020-01-20 ENCOUNTER — OFFICE VISIT (OUTPATIENT)
Dept: INTERNAL MEDICINE CLINIC | Age: 80
End: 2020-01-20
Payer: MEDICAID

## 2020-01-20 VITALS
TEMPERATURE: 97.5 F | SYSTOLIC BLOOD PRESSURE: 120 MMHG | BODY MASS INDEX: 25.4 KG/M2 | HEIGHT: 62 IN | DIASTOLIC BLOOD PRESSURE: 68 MMHG | WEIGHT: 138 LBS

## 2020-01-20 LAB
BILIRUBIN, POC: NORMAL
BLOOD URINE, POC: NORMAL
CLARITY, POC: CLEAR
COLOR, POC: YELLOW
GLUCOSE URINE, POC: NORMAL
KETONES, POC: NORMAL
LEUKOCYTE EST, POC: NORMAL
NITRITE, POC: NORMAL
PH, POC: 6
PROTEIN, POC: NORMAL
SPECIFIC GRAVITY, POC: 1.02
UROBILINOGEN, POC: 0.2

## 2020-01-20 PROCEDURE — 81002 URINALYSIS NONAUTO W/O SCOPE: CPT | Performed by: INTERNAL MEDICINE

## 2020-01-20 PROCEDURE — 99213 OFFICE O/P EST LOW 20 MIN: CPT | Performed by: INTERNAL MEDICINE

## 2020-01-20 SDOH — ECONOMIC STABILITY: TRANSPORTATION INSECURITY
IN THE PAST 12 MONTHS, HAS LACK OF TRANSPORTATION KEPT YOU FROM MEETINGS, WORK, OR FROM GETTING THINGS NEEDED FOR DAILY LIVING?: NO

## 2020-01-20 SDOH — ECONOMIC STABILITY: FOOD INSECURITY: WITHIN THE PAST 12 MONTHS, YOU WORRIED THAT YOUR FOOD WOULD RUN OUT BEFORE YOU GOT MONEY TO BUY MORE.: NEVER TRUE

## 2020-01-20 SDOH — ECONOMIC STABILITY: FOOD INSECURITY: WITHIN THE PAST 12 MONTHS, THE FOOD YOU BOUGHT JUST DIDN'T LAST AND YOU DIDN'T HAVE MONEY TO GET MORE.: NEVER TRUE

## 2020-01-20 SDOH — ECONOMIC STABILITY: INCOME INSECURITY: HOW HARD IS IT FOR YOU TO PAY FOR THE VERY BASICS LIKE FOOD, HOUSING, MEDICAL CARE, AND HEATING?: NOT HARD AT ALL

## 2020-01-20 SDOH — ECONOMIC STABILITY: TRANSPORTATION INSECURITY
IN THE PAST 12 MONTHS, HAS THE LACK OF TRANSPORTATION KEPT YOU FROM MEDICAL APPOINTMENTS OR FROM GETTING MEDICATIONS?: NO

## 2020-01-20 ASSESSMENT — PATIENT HEALTH QUESTIONNAIRE - PHQ9
SUM OF ALL RESPONSES TO PHQ QUESTIONS 1-9: 0
SUM OF ALL RESPONSES TO PHQ9 QUESTIONS 1 & 2: 0
SUM OF ALL RESPONSES TO PHQ QUESTIONS 1-9: 0
1. LITTLE INTEREST OR PLEASURE IN DOING THINGS: 0
2. FEELING DOWN, DEPRESSED OR HOPELESS: 0

## 2020-01-20 NOTE — PROGRESS NOTES
CHIEF COMPLAINT: Shine Elam is a [de-identified] y.o. female who presents for : Follow-up abdominal pain    HPI: Patient presented with progressive gastric pain she had an ETT which showed H. pylori and she is been treated for this she still having some epigastric pain is worried that it might be her pancreas although she is fine now she also has some urinary frequency    Review of Systems:   Constitutional:  Denies fever or chills   Eyes:  Denies change in visual acuity   HENT:  Denies nasal congestion or sore throat   Respiratory:  Denies cough or shortness of breath   Cardiovascular:  Denies chest pain or edema   GI:  Denies abdominal pain, nausea, vomiting, bloody stools or diarrhea   :  Denies dysuria   Musculoskeletal:  Denies back pain or joint pain   Integument:  Denies rash   Neurologic:  Denies headache, focal weakness or sensory changes   Endocrine:  Denies polyuria or polydipsia   Lymphatic:  Denies swollen glands   Psychiatric:  Denies depression or anxiety     Past Medical History:        Diagnosis Date    [facility] [place] [facility] 2/14/2012    Diarrhea 7/6/2012    Glaucoma 4/15/2014    H/O: hysterectomy 2/14/2012    History of thyroiditis 08/08/2012    HTN (hypertension) 2/14/2012    Hypothyroidism     IFG (impaired fasting glucose)     Iron deficiency     Liver cyst 8/7/2012    Thalassemia minor     Tonsil stone     Vertigo 5/22/2018    Vitamin D deficiency 4/18/2014       Past Surgical History:        Procedure Laterality Date    BREAST SURGERY      HYSTERECTOMY         Family History:  Family History   Problem Relation Age of Onset    Stroke Mother     Heart Disease Father     High Blood Pressure Father        Social History:  Social History     Socioeconomic History    Marital status:      Spouse name: None    Number of children: None    Years of education: None    Highest education level: None   Occupational History    None   Social Needs    Financial resource strain: Not hard at all    Food insecurity:     Worry: Never true     Inability: Never true    Transportation needs:     Medical: No     Non-medical: No   Tobacco Use    Smoking status: Never Smoker    Smokeless tobacco: Never Used   Substance and Sexual Activity    Alcohol use: No    Drug use: None    Sexual activity: None   Lifestyle    Physical activity:     Days per week: None     Minutes per session: None    Stress: None   Relationships    Social connections:     Talks on phone: None     Gets together: None     Attends [employer] service: None     Active member of club or organization: None     Attends meetings of clubs or organizations: None     Relationship status: None    Intimate partner violence:     Fear of current or ex partner: None     Emotionally abused: None     Physically abused: None     Forced sexual activity: None   Other Topics Concern    None   Social History Narrative    None         Allergies:  Latex and Ace inhibitors    Current Medications:    Prior to Admission medications    Medication Sig Start Date End Date Taking?  Authorizing Provider   diclofenac (VOLTAREN) 75 MG EC tablet TAKE ONE TABLET BY MOUTH TWICE A DAY WITH MEALS 6/3/19  Yes Lizzie Pnada MD   bisoprolol-hydrochlorothiazide [facility]) 2.5-6.25 MG per tablet TAKE ONE TABLET BY MOUTH TWICE DAILY 5/20/19  Yes Lizzie Panda MD   atorvastatin (LIPITOR) 20 MG tablet Take 0.5 tablets by mouth daily 6/30/17  Yes Feli Rubin MD   vitamin D (ERGOCALCIFEROL) 34882 UNITS CAPS capsule TAKE ONE CAPSULE BY MOUTH ONCE WEEKLY 6/5/17  Yes AUTUMN Jj CNP       Physical Exam:  Vital Signs: /68 (Site: Right Upper Arm)   Temp 97.5 °F (36.4 °C)   Ht 5' 2\" (1.575 m)   Wt 138 lb (62.6 kg)   BMI 25.24 kg/m²   General: Patient appears  non-toxic  HENT: Atraumatic, normocephalic, oral mucosa moist  Lungs:  Clear bilaterally  Heart: Regular rate and rhythm  Abdomen: Non-distended, soft, non-tender  Extremities: No edema  Neuro: Nonfocal    Medical Decision Making and Plan:  Pertinent Labs & Imaging studies reviewed. (See chart for details)  UA blood tests are pending    1. Epigastric pain  Probable irritable bowel check above labs and reassure if labs okay  - CBC Auto Differential; Future  - Comprehensive Metabolic Panel; Future  - LIPASE;  Future

## 2020-02-28 LAB
ALBUMIN SERPL-MCNC: 4.2 G/DL (ref 3.5–5.7)
ALP BLD-CCNC: 68 U/L (ref 36–125)
ALT SERPL-CCNC: 10 U/L (ref 7–52)
ANION GAP SERPL CALCULATED.3IONS-SCNC: 16 MMOL/L (ref 3–16)
AST SERPL-CCNC: 16 U/L (ref 13–39)
BASOPHILS ABSOLUTE: 39 /UL (ref 0–200)
BASOPHILS RELATIVE PERCENT: 0.5 % (ref 0–1)
BILIRUB SERPL-MCNC: 1.1 MG/DL (ref 0–1.5)
BUN BLDV-MCNC: 22 MG/DL (ref 7–25)
CALCIUM SERPL-MCNC: 10.4 MG/DL (ref 8.6–10.3)
CHLORIDE BLD-SCNC: 100 MMOL/L (ref 98–110)
CO2: 24 MMOL/L (ref 21–33)
CREAT SERPL-MCNC: 0.9 MG/DL (ref 0.6–1.3)
EOSINOPHILS ABSOLUTE: 323 /UL (ref 15–500)
EOSINOPHILS RELATIVE PERCENT: 4.2 % (ref 0–8)
FOLATE: 9.6 NG/ML (ref 5.9–24.8)
GFR, ESTIMATED: 60 SEE NOTE.
GFR, ESTIMATED: 70 SEE NOTE.
GLUCOSE BLD-MCNC: 110 MG/DL (ref 70–100)
HCT VFR BLD CALC: 41 % (ref 35–45)
HEMOGLOBIN: 13.5 G/DL (ref 11.7–15.5)
LIPASE: 139 U/L (ref 4–82)
LYMPHOCYTES ABSOLUTE: 2864 /UL (ref 850–3900)
LYMPHOCYTES RELATIVE PERCENT: 37.2 % (ref 15–45)
MCH RBC QN AUTO: 27.4 PG (ref 27–33)
MCHC RBC AUTO-ENTMCNC: 32.8 G/DL (ref 32–36)
MCV RBC AUTO: 83.3 FL (ref 80–100)
MONOCYTES ABSOLUTE: 608 /UL (ref 200–950)
MONOCYTES RELATIVE PERCENT: 7.9 % (ref 0–12)
NEUTROPHILS ABSOLUTE: 3865 /UL (ref 1500–7800)
NUCLEATED RED BLOOD CELLS: 0 /100 WBC (ref 0–0)
OSMOLALITY CALCULATION: 294 MOSM/KG (ref 278–305)
PDW BLD-RTO: 14.3 % (ref 11–15)
PLATELET # BLD: 217 10E3/UL (ref 140–400)
PMV BLD AUTO: 9.4 FL (ref 7.5–11.5)
POTASSIUM SERPL-SCNC: 4 MMOL/L (ref 3.5–5.3)
RBC # BLD: 4.93 10E6/UL (ref 3.8–5.1)
SEGMENTED NEUTROPHILS RELATIVE PERCENT: 50.2 % (ref 40–80)
SODIUM BLD-SCNC: 140 MMOL/L (ref 133–146)
T4 FREE: 0.97 NG/DL (ref 0.61–1.76)
TOTAL PROTEIN: 7.4 G/DL (ref 6.4–8.9)
TSH, 3RD GENERATION: 2.42 UIU/ML (ref 0.45–4.12)
VITAMIN B-12: 585 PG/ML (ref 180–914)
WBC # BLD: 7.7 10E3/UL (ref 3.8–10.8)

## 2020-03-01 LAB — HOMOCYSTINE BLOOD: 11.6 UMOL/L (ref 5–12)

## 2020-03-03 LAB — T3 REVERSE: 22.6 NG/DL (ref 9.2–24.1)

## 2020-03-04 LAB — VITAMIN B6: 5.8 UG/L (ref 2–32.8)

## 2020-06-01 RX ORDER — BISOPROLOL FUMARATE AND HYDROCHLOROTHIAZIDE 2.5; 6.25 MG/1; MG/1
TABLET ORAL
Qty: 60 TABLET | Refills: 2 | Status: SHIPPED | OUTPATIENT
Start: 2020-06-01 | End: 2020-10-19 | Stop reason: SDUPTHER

## 2020-10-19 ENCOUNTER — VIRTUAL VISIT (OUTPATIENT)
Dept: INTERNAL MEDICINE CLINIC | Age: 80
End: 2020-10-19
Payer: MEDICAID

## 2020-10-19 PROCEDURE — 99213 OFFICE O/P EST LOW 20 MIN: CPT | Performed by: INTERNAL MEDICINE

## 2020-10-19 RX ORDER — LATANOPROST 50 UG/ML
1 SOLUTION/ DROPS OPHTHALMIC NIGHTLY
COMMUNITY

## 2020-10-19 RX ORDER — BISOPROLOL FUMARATE AND HYDROCHLOROTHIAZIDE 2.5; 6.25 MG/1; MG/1
TABLET ORAL
Qty: 60 TABLET | Refills: 2 | Status: ON HOLD | OUTPATIENT
Start: 2020-10-19 | End: 2021-03-31 | Stop reason: HOSPADM

## 2020-10-19 NOTE — PROGRESS NOTES
10/19/2020    TELEHEALTH EVALUATION -- Audio/Visual (During IAJFA-75 public health emergency)    HPI: Follow-up of hypertension abdominal discomfort    Stacia Quinones (:  1940) has requested an audio/video evaluation for the following concern(s):    Aloe up of her hypertension her blood pressures been well controlled at home averaging about 130/70 she still having some vague abdominal pain with diarrhea and also requests thyroid check has had a history of hypercalcemia as well with a normal PTH    Review of Systems no fevers chills shortness of breath    Prior to Visit Medications    Medication Sig Taking?  Authorizing Provider   latanoprost (XALATAN) 0.005 % ophthalmic solution 1 drop nightly Yes Historical Provider, MD   bisoprolol-hydroCHLOROthiazide ([place] [facility]) 2.5-6.25 MG per tablet TAKE ONE TABLET BY MOUTH TWICE A DAY Yes Venkat Pires MD   diclofenac (VOLTAREN) 75 MG EC tablet TAKE ONE TABLET BY MOUTH TWICE A DAY WITH MEALS Yes Venkat Pires MD   atorvastatin (LIPITOR) 20 MG tablet Take 0.5 tablets by mouth daily Yes Juan Antonio Alcantara MD   vitamin D (ERGOCALCIFEROL) 67499 UNITS CAPS capsule TAKE ONE CAPSULE BY MOUTH ONCE WEEKLY Yes AUTUMN Hernandez - BLAYNE       Social History     Tobacco Use    Smoking status: Never Smoker    Smokeless tobacco: Never Used   Substance Use Topics    Alcohol use: No    Drug use: Not on file        Past Medical History:   Diagnosis Date    [facility]    Diarrhea 2012    Glaucoma 4/15/2014    H/O: hysterectomy 2012    History of thyroiditis 2012    HTN (hypertension) 2012    Hypothyroidism     IFG (impaired fasting glucose)     Iron deficiency     Liver cyst 2012    Thalassemia minor     Tonsil stone     Vertigo 2018    Vitamin D deficiency 2014       PHYSICAL EXAMINATION:  [ INSTRUCTIONS:  \"[x]\" Indicates a positive item  \"[]\" Indicates a negative item  -- DELETE ALL ITEMS NOT EXAMINED]  Vital Signs: (As obtained by patient/caregiver or practitioner observation)    Blood pressure-  Heart rate-    Respiratory rate-    Temperature-  Pulse oximetry-     Constitutional: [] Appears well-developed and well-nourished [] No apparent distress      [] Abnormal-   Mental status  [x] Alert and awake  [x] Oriented to person/place/time []Able to follow commands      Eyes:  EOM    []  Normal  [] Abnormal-  Sclera  []  Normal  [] Abnormal -         Discharge []  None visible  [] Abnormal -    HENT:   [] Normocephalic, atraumatic. [] Abnormal   [] Mouth/Throat: Mucous membranes are moist.     External Ears [] Normal  [] Abnormal-     Neck: [] No visualized mass     Pulmonary/Chest: [x] Respiratory effort normal.  [x] No visualized signs of difficulty breathing or respiratory distress        [] Abnormal-      Musculoskeletal:   [] Normal gait with no signs of ataxia         [] Normal range of motion of neck        [] Abnormal-       Neurological:        [] No Facial Asymmetry (Cranial nerve 7 motor function) (limited exam to video visit)          [] No gaze palsy        [] Abnormal-         Skin:        [] No significant exanthematous lesions or discoloration noted on facial skin         [] Abnormal-            Psychiatric:       [x] Normal Affect [] No Hallucinations        [] Abnormal-     Other pertinent observable physical exam findings-     ASSESSMENT/PLAN:  1. Essential hypertension  This problem is stable continue present meds  - bisoprolol-hydroCHLOROthiazide (ZIAC) 2.5-6.25 MG per tablet; TAKE ONE TABLET BY MOUTH TWICE A DAY  Dispense: 60 tablet; Refill: 2  - CBC Auto Differential; Future  - COMPREHENSIVE METABOLIC PANEL; Future  - PTH, INTACT; Future  - TSH with Reflex; Future    2. Hypercalcemia  From stable check above labs to need to monitor she refuses a flu shot and will follow up in 3 months  - CBC Auto Differential; Future  - COMPREHENSIVE METABOLIC PANEL; Future  - PTH, INTACT;  Future  - TSH with Reflex;

## 2020-11-23 RX ORDER — DICLOFENAC SODIUM 75 MG/1
TABLET, DELAYED RELEASE ORAL
Qty: 60 TABLET | Refills: 0 | Status: SHIPPED | OUTPATIENT
Start: 2020-11-23

## 2021-01-29 ENCOUNTER — TELEPHONE (OUTPATIENT)
Dept: INTERNAL MEDICINE CLINIC | Age: 81
End: 2021-01-29

## 2021-02-03 ENCOUNTER — TELEPHONE (OUTPATIENT)
Dept: INTERNAL MEDICINE CLINIC | Age: 81
End: 2021-02-03

## 2021-02-05 NOTE — TELEPHONE ENCOUNTER
Spoke to pt daughter gave advise. She is seeing someone already at [facility].

## 2021-03-30 ENCOUNTER — HOSPITAL ENCOUNTER (INPATIENT)
Age: 81
LOS: 1 days | Discharge: HOME | DRG: 207 | End: 2021-04-01
Attending: EMERGENCY MEDICINE | Admitting: INTERNAL MEDICINE
Payer: MEDICAID

## 2021-03-30 ENCOUNTER — APPOINTMENT (OUTPATIENT)
Dept: CT IMAGING | Age: 81
DRG: 207 | End: 2021-03-30
Payer: MEDICAID

## 2021-03-30 ENCOUNTER — HOSPITAL ENCOUNTER (OUTPATIENT)
Dept: CT IMAGING | Age: 81
Discharge: HOME | DRG: 207 | End: 2021-03-30
Payer: MEDICAID

## 2021-03-30 VITALS
RESPIRATION RATE: 20 BRPM | TEMPERATURE: 97.4 F | HEART RATE: 69 BPM | OXYGEN SATURATION: 100 % | SYSTOLIC BLOOD PRESSURE: 125 MMHG | DIASTOLIC BLOOD PRESSURE: 52 MMHG

## 2021-03-30 DIAGNOSIS — K86.9 PANCREATIC LESION: ICD-10-CM

## 2021-03-30 DIAGNOSIS — K76.89 LIVER NODULE: ICD-10-CM

## 2021-03-30 DIAGNOSIS — R58 HEMORRHAGE: Primary | ICD-10-CM

## 2021-03-30 PROBLEM — R55 PRE-SYNCOPE: Status: ACTIVE | Noted: 2021-03-30

## 2021-03-30 LAB
A/G RATIO: 1.2 (ref 1.1–2.2)
ALBUMIN SERPL-MCNC: 3.2 G/DL (ref 3.4–5)
ALP BLD-CCNC: 324 U/L (ref 40–129)
ALT SERPL-CCNC: 83 U/L (ref 10–40)
ANION GAP SERPL CALCULATED.3IONS-SCNC: 15 MMOL/L (ref 3–16)
APTT: 27 SEC (ref 24.2–36.2)
AST SERPL-CCNC: 63 U/L (ref 15–37)
BASOPHILS ABSOLUTE: 0.1 K/UL (ref 0–0.2)
BASOPHILS RELATIVE PERCENT: 0.5 %
BILIRUB SERPL-MCNC: 1.1 MG/DL (ref 0–1)
BUN BLDV-MCNC: 18 MG/DL (ref 7–20)
CALCIUM SERPL-MCNC: 9 MG/DL (ref 8.3–10.6)
CHLORIDE BLD-SCNC: 100 MMOL/L (ref 99–110)
CO2: 21 MMOL/L (ref 21–32)
CREAT SERPL-MCNC: 0.8 MG/DL (ref 0.6–1.2)
EKG ATRIAL RATE: 63 BPM
EKG DIAGNOSIS: NORMAL
EKG P AXIS: 78 DEGREES
EKG P-R INTERVAL: 150 MS
EKG Q-T INTERVAL: 434 MS
EKG QRS DURATION: 78 MS
EKG QTC CALCULATION (BAZETT): 444 MS
EKG R AXIS: 59 DEGREES
EKG T AXIS: 83 DEGREES
EKG VENTRICULAR RATE: 63 BPM
EOSINOPHILS ABSOLUTE: 0.1 K/UL (ref 0–0.6)
EOSINOPHILS RELATIVE PERCENT: 0.7 %
GFR AFRICAN AMERICAN: >60
GFR NON-AFRICAN AMERICAN: >60
GLOBULIN: 2.7 G/DL
GLUCOSE BLD-MCNC: 139 MG/DL (ref 70–99)
GLUCOSE BLD-MCNC: 157 MG/DL (ref 70–99)
HCT VFR BLD CALC: 31.2 % (ref 36–48)
HCT VFR BLD CALC: 32.9 % (ref 36–48)
HEMOGLOBIN: 10.5 G/DL (ref 12–16)
HEMOGLOBIN: 11.6 G/DL (ref 12–16)
INR BLD: 1.06 (ref 0.86–1.14)
LACTIC ACID: 1.4 MMOL/L (ref 0.4–2)
LACTIC ACID: 2.6 MMOL/L (ref 0.4–2)
LYMPHOCYTES ABSOLUTE: 1.8 K/UL (ref 1–5.1)
LYMPHOCYTES RELATIVE PERCENT: 16.8 %
MAGNESIUM: 1.7 MG/DL (ref 1.8–2.4)
MCH RBC QN AUTO: 30 PG (ref 26–34)
MCHC RBC AUTO-ENTMCNC: 35.1 G/DL (ref 31–36)
MCV RBC AUTO: 85.5 FL (ref 80–100)
MONOCYTES ABSOLUTE: 1.1 K/UL (ref 0–1.3)
MONOCYTES RELATIVE PERCENT: 10.1 %
NEUTROPHILS ABSOLUTE: 7.7 K/UL (ref 1.7–7.7)
NEUTROPHILS RELATIVE PERCENT: 71.9 %
PDW BLD-RTO: 12.9 % (ref 12.4–15.4)
PERFORMED ON: ABNORMAL
PLATELET # BLD: 226 K/UL (ref 135–450)
PLATELET # BLD: 228 K/UL (ref 135–450)
PMV BLD AUTO: 10 FL (ref 5–10.5)
POTASSIUM REFLEX MAGNESIUM: 4 MMOL/L (ref 3.5–5.1)
PROTHROMBIN TIME: 12.3 SEC (ref 10–13.2)
RBC # BLD: 3.85 M/UL (ref 4–5.2)
SODIUM BLD-SCNC: 136 MMOL/L (ref 136–145)
TOTAL PROTEIN: 5.9 G/DL (ref 6.4–8.2)
TROPONIN: <0.01 NG/ML
WBC # BLD: 10.7 K/UL (ref 4–11)

## 2021-03-30 PROCEDURE — 2500000003 HC RX 250 WO HCPCS: Performed by: RADIOLOGY

## 2021-03-30 PROCEDURE — 83735 ASSAY OF MAGNESIUM: CPT

## 2021-03-30 PROCEDURE — 36415 COLL VENOUS BLD VENIPUNCTURE: CPT

## 2021-03-30 PROCEDURE — 83605 ASSAY OF LACTIC ACID: CPT

## 2021-03-30 PROCEDURE — 88342 IMHCHEM/IMCYTCHM 1ST ANTB: CPT

## 2021-03-30 PROCEDURE — 88307 TISSUE EXAM BY PATHOLOGIST: CPT

## 2021-03-30 PROCEDURE — 85730 THROMBOPLASTIN TIME PARTIAL: CPT

## 2021-03-30 PROCEDURE — 88360 TUMOR IMMUNOHISTOCHEM/MANUAL: CPT

## 2021-03-30 PROCEDURE — 6360000002 HC RX W HCPCS: Performed by: STUDENT IN AN ORGANIZED HEALTH CARE EDUCATION/TRAINING PROGRAM

## 2021-03-30 PROCEDURE — 6360000002 HC RX W HCPCS: Performed by: EMERGENCY MEDICINE

## 2021-03-30 PROCEDURE — 6360000004 HC RX CONTRAST MEDICATION: Performed by: STUDENT IN AN ORGANIZED HEALTH CARE EDUCATION/TRAINING PROGRAM

## 2021-03-30 PROCEDURE — 2580000003 HC RX 258: Performed by: STUDENT IN AN ORGANIZED HEALTH CARE EDUCATION/TRAINING PROGRAM

## 2021-03-30 PROCEDURE — 2709999900 CT NEEDLE BIOPSY LIVER PERCUTANEOUS

## 2021-03-30 PROCEDURE — 6360000002 HC RX W HCPCS: Performed by: RADIOLOGY

## 2021-03-30 PROCEDURE — 7100000011 HC PHASE II RECOVERY - ADDTL 15 MIN

## 2021-03-30 PROCEDURE — 51798 US URINE CAPACITY MEASURE: CPT

## 2021-03-30 PROCEDURE — 85025 COMPLETE CBC W/AUTO DIFF WBC: CPT

## 2021-03-30 PROCEDURE — 84484 ASSAY OF TROPONIN QUANT: CPT

## 2021-03-30 PROCEDURE — 85049 AUTOMATED PLATELET COUNT: CPT

## 2021-03-30 PROCEDURE — 88341 IMHCHEM/IMCYTCHM EA ADD ANTB: CPT

## 2021-03-30 PROCEDURE — 85018 HEMOGLOBIN: CPT

## 2021-03-30 PROCEDURE — 85014 HEMATOCRIT: CPT

## 2021-03-30 PROCEDURE — 74178 CT ABD&PLV WO CNTR FLWD CNTR: CPT

## 2021-03-30 PROCEDURE — G0378 HOSPITAL OBSERVATION PER HR: HCPCS

## 2021-03-30 PROCEDURE — 80053 COMPREHEN METABOLIC PANEL: CPT

## 2021-03-30 PROCEDURE — 99283 EMERGENCY DEPT VISIT LOW MDM: CPT

## 2021-03-30 PROCEDURE — 0FB13ZX EXCISION OF RIGHT LOBE LIVER, PERCUTANEOUS APPROACH, DIAGNOSTIC: ICD-10-PCS | Performed by: RADIOLOGY

## 2021-03-30 PROCEDURE — 7100000010 HC PHASE II RECOVERY - FIRST 15 MIN

## 2021-03-30 PROCEDURE — 85610 PROTHROMBIN TIME: CPT

## 2021-03-30 RX ORDER — MORPHINE SULFATE 2 MG/ML
1 INJECTION, SOLUTION INTRAMUSCULAR; INTRAVENOUS EVERY 8 HOURS PRN
Status: DISCONTINUED | OUTPATIENT
Start: 2021-03-30 | End: 2021-04-01 | Stop reason: HOSPADM

## 2021-03-30 RX ORDER — SODIUM CHLORIDE 9 MG/ML
25 INJECTION, SOLUTION INTRAVENOUS PRN
Status: DISCONTINUED | OUTPATIENT
Start: 2021-03-30 | End: 2021-04-01 | Stop reason: HOSPADM

## 2021-03-30 RX ORDER — POLYETHYLENE GLYCOL 3350 17 G/17G
17 POWDER, FOR SOLUTION ORAL DAILY PRN
Status: DISCONTINUED | OUTPATIENT
Start: 2021-03-30 | End: 2021-04-01 | Stop reason: HOSPADM

## 2021-03-30 RX ORDER — SODIUM CHLORIDE 0.9 % (FLUSH) 0.9 %
10 SYRINGE (ML) INJECTION PRN
Status: DISCONTINUED | OUTPATIENT
Start: 2021-03-30 | End: 2021-04-01 | Stop reason: HOSPADM

## 2021-03-30 RX ORDER — SODIUM CHLORIDE 9 MG/ML
INJECTION, SOLUTION INTRAVENOUS CONTINUOUS
Status: DISCONTINUED | OUTPATIENT
Start: 2021-03-30 | End: 2021-03-31

## 2021-03-30 RX ORDER — ACETAMINOPHEN 325 MG/1
650 TABLET ORAL EVERY 6 HOURS PRN
Status: DISCONTINUED | OUTPATIENT
Start: 2021-03-30 | End: 2021-04-01 | Stop reason: HOSPADM

## 2021-03-30 RX ORDER — MORPHINE SULFATE 4 MG/ML
4 INJECTION, SOLUTION INTRAMUSCULAR; INTRAVENOUS ONCE
Status: COMPLETED | OUTPATIENT
Start: 2021-03-30 | End: 2021-03-30

## 2021-03-30 RX ORDER — LIDOCAINE HYDROCHLORIDE 20 MG/ML
15 INJECTION, SOLUTION EPIDURAL; INFILTRATION; INTRACAUDAL; PERINEURAL ONCE
Status: COMPLETED | OUTPATIENT
Start: 2021-03-30 | End: 2021-03-30

## 2021-03-30 RX ORDER — FENTANYL CITRATE 50 UG/ML
50 INJECTION, SOLUTION INTRAMUSCULAR; INTRAVENOUS ONCE
Status: COMPLETED | OUTPATIENT
Start: 2021-03-30 | End: 2021-03-30

## 2021-03-30 RX ORDER — OXYCODONE HYDROCHLORIDE 5 MG/1
5 TABLET ORAL ONCE
Status: DISCONTINUED | OUTPATIENT
Start: 2021-03-30 | End: 2021-03-30

## 2021-03-30 RX ORDER — MIDAZOLAM HYDROCHLORIDE 1 MG/ML
1 INJECTION INTRAMUSCULAR; INTRAVENOUS ONCE
Status: COMPLETED | OUTPATIENT
Start: 2021-03-30 | End: 2021-03-30

## 2021-03-30 RX ORDER — MAGNESIUM SULFATE IN WATER 40 MG/ML
2000 INJECTION, SOLUTION INTRAVENOUS ONCE
Status: COMPLETED | OUTPATIENT
Start: 2021-03-30 | End: 2021-03-30

## 2021-03-30 RX ORDER — NALOXONE HYDROCHLORIDE 0.4 MG/ML
0.4 INJECTION, SOLUTION INTRAMUSCULAR; INTRAVENOUS; SUBCUTANEOUS PRN
Status: DISCONTINUED | OUTPATIENT
Start: 2021-03-30 | End: 2021-04-01 | Stop reason: HOSPADM

## 2021-03-30 RX ORDER — LABETALOL HYDROCHLORIDE 5 MG/ML
5 INJECTION, SOLUTION INTRAVENOUS EVERY 6 HOURS PRN
Status: DISCONTINUED | OUTPATIENT
Start: 2021-03-30 | End: 2021-04-01 | Stop reason: HOSPADM

## 2021-03-30 RX ORDER — PROMETHAZINE HYDROCHLORIDE 12.5 MG/1
12.5 TABLET ORAL EVERY 6 HOURS PRN
Status: DISCONTINUED | OUTPATIENT
Start: 2021-03-30 | End: 2021-04-01 | Stop reason: HOSPADM

## 2021-03-30 RX ORDER — ACETAMINOPHEN 650 MG/1
650 SUPPOSITORY RECTAL EVERY 6 HOURS PRN
Status: DISCONTINUED | OUTPATIENT
Start: 2021-03-30 | End: 2021-04-01 | Stop reason: HOSPADM

## 2021-03-30 RX ORDER — ONDANSETRON 2 MG/ML
4 INJECTION INTRAMUSCULAR; INTRAVENOUS EVERY 6 HOURS PRN
Status: DISCONTINUED | OUTPATIENT
Start: 2021-03-30 | End: 2021-04-01 | Stop reason: HOSPADM

## 2021-03-30 RX ORDER — SODIUM CHLORIDE 0.9 % (FLUSH) 0.9 %
10 SYRINGE (ML) INJECTION EVERY 12 HOURS SCHEDULED
Status: DISCONTINUED | OUTPATIENT
Start: 2021-03-30 | End: 2021-04-01 | Stop reason: HOSPADM

## 2021-03-30 RX ORDER — 0.9 % SODIUM CHLORIDE 0.9 %
1000 INTRAVENOUS SOLUTION INTRAVENOUS ONCE
Status: COMPLETED | OUTPATIENT
Start: 2021-03-30 | End: 2021-03-30

## 2021-03-30 RX ADMIN — LIDOCAINE HYDROCHLORIDE 15 ML: 20 INJECTION, SOLUTION EPIDURAL; INFILTRATION; INTRACAUDAL; PERINEURAL at 10:25

## 2021-03-30 RX ADMIN — IOPAMIDOL 80 ML: 755 INJECTION, SOLUTION INTRAVENOUS at 12:20

## 2021-03-30 RX ADMIN — MAGNESIUM SULFATE HEPTAHYDRATE 2000 MG: 40 INJECTION, SOLUTION INTRAVENOUS at 16:35

## 2021-03-30 RX ADMIN — SODIUM CHLORIDE 1000 ML: 0.9 INJECTION, SOLUTION INTRAVENOUS at 11:15

## 2021-03-30 RX ADMIN — MIDAZOLAM HYDROCHLORIDE 1 MG: 2 INJECTION, SOLUTION INTRAMUSCULAR; INTRAVENOUS at 10:25

## 2021-03-30 RX ADMIN — MORPHINE SULFATE 4 MG: 4 INJECTION, SOLUTION INTRAMUSCULAR; INTRAVENOUS at 14:30

## 2021-03-30 RX ADMIN — FENTANYL CITRATE 50 MCG: 50 INJECTION, SOLUTION INTRAMUSCULAR; INTRAVENOUS at 10:25

## 2021-03-30 RX ADMIN — SODIUM CHLORIDE: 9 INJECTION, SOLUTION INTRAVENOUS at 16:35

## 2021-03-30 ASSESSMENT — PAIN DESCRIPTION - PROGRESSION
CLINICAL_PROGRESSION: GRADUALLY WORSENING
CLINICAL_PROGRESSION: NOT CHANGED
CLINICAL_PROGRESSION: NOT CHANGED

## 2021-03-30 ASSESSMENT — PAIN DESCRIPTION - DESCRIPTORS
DESCRIPTORS: ACHING
DESCRIPTORS: ACHING
DESCRIPTORS: PATIENT UNABLE TO DESCRIBE

## 2021-03-30 ASSESSMENT — PAIN - FUNCTIONAL ASSESSMENT
PAIN_FUNCTIONAL_ASSESSMENT: ACTIVITIES ARE NOT PREVENTED

## 2021-03-30 ASSESSMENT — PAIN SCALES - GENERAL
PAINLEVEL_OUTOF10: 7
PAINLEVEL_OUTOF10: 5
PAINLEVEL_OUTOF10: 4

## 2021-03-30 ASSESSMENT — PAIN DESCRIPTION - FREQUENCY
FREQUENCY: INTERMITTENT

## 2021-03-30 ASSESSMENT — ENCOUNTER SYMPTOMS
VOMITING: 0
ABDOMINAL PAIN: 1
SHORTNESS OF BREATH: 0

## 2021-03-30 ASSESSMENT — PAIN DESCRIPTION - ONSET
ONSET: ON-GOING
ONSET: ON-GOING

## 2021-03-30 ASSESSMENT — PAIN DESCRIPTION - PAIN TYPE
TYPE: SURGICAL PAIN

## 2021-03-30 ASSESSMENT — PAIN DESCRIPTION - ORIENTATION
ORIENTATION: RIGHT;UPPER
ORIENTATION: RIGHT;UPPER

## 2021-03-30 ASSESSMENT — PAIN DESCRIPTION - LOCATION: LOCATION: ABDOMEN

## 2021-03-30 NOTE — FLOWSHEET NOTE
03/30/21 1304   Encounter Summary   Services provided to: Patient and family together   Continue Visiting   (3/30, jonatan )   Complexity of Encounter High   Length of Encounter 45 minutes   Crisis   Type Code   Assessment Calm; Approachable; Hopeful;Coping   Intervention Active listening;Explored feelings, thoughts, concerns;Nurtured hope;Sustaining presence/ Ministry of presence; Discussed belief system/Temple practices/kem  (Cultural Support)   Outcome Comfort;Expressed gratitude;Expressed feelings/needs/concerns; Less anxious, less agitated; De-escalated   Staff Jaki Kingston MA

## 2021-03-30 NOTE — ED PROVIDER NOTES
[id number]          EM RESIDENT NOTE       Date of evaluation: 3/30/2021    Chief Complaint     Loss of Consciousness (patient in recovery from liver biopsy, suddenly felt weak and SOB, nurse went to set patient up to breathe better and patient got verry dizzy, BP down to 70s)      History of Present Illness     Deyanira Dhaliwal is a 80 y.o. female who presents after episode of dizziness/hypotension after a liver biopsy. Patient has a history of breast cancer with multiple liver and pancreatic masses, suspected metastasis. Patient underwent liver biopsy earlier today, was in recovery when she went to sit up, had severe right upper quadrant pain, became dizzy and noted to be hypotensive. A CODE BLUE was called and labs were ordered and patient was transferred to the ED. Patient reported pain in her belly during this time, and stated her vision was becoming blurry. She denies chest pain/palpitations, weakness, numbness/tingling and shortness of breath during this episode. She has never had an episode like this prior. On arrival, patient was alert and oriented x4 with an improvement in blood pressure, patient was noted actually be hypertensive to 161/60. Review of Systems     She denies fevers, chills, nausea/vomiting/diarrhea, shortness of breath, cough, chest pain/palpitations, dysuria, hematuria, vaginal discharge and edema. Past Medical, Surgical, Family, and Social History     She has a past medical history of Breast cancer ([address] Diarrhea, Glaucoma, H/O: hysterectomy, History of thyroiditis, HTN (hypertension), Hypothyroidism, IFG (impaired fasting glucose), Iron deficiency, Liver cyst, Thalassemia minor, Tonsil stone, Vertigo, and Vitamin D deficiency. She has a past surgical history that includes Hysterectomy; Breast surgery; and CT NEEDLE BIOPSY LIVER PERCUTANEOUS (3/30/2021).   Her family history includes Heart Disease in her father; High Blood Pressure in her father; Stroke in her mother. She reports that she has never smoked. She has never used smokeless tobacco. She reports previous drug use. She reports that she does not drink alcohol. Medications     Current Discharge Medication List      CONTINUE these medications which have NOT CHANGED    Details   latanoprost (XALATAN) 0.005 % ophthalmic solution 1 drop nightly      bisoprolol-hydroCHLOROthiazide (ZIAC) 2.5-6.25 MG per tablet TAKE ONE TABLET BY MOUTH TWICE A DAY  Qty: 60 tablet, Refills: 2    Associated Diagnoses: Essential hypertension      diclofenac (VOLTAREN) 75 MG EC tablet TAKE ONE TABLET BY MOUTH TWICE A DAY WITH MEALS  Qty: 60 tablet, Refills: 0      vitamin D (ERGOCALCIFEROL) 84413 UNITS CAPS capsule TAKE ONE CAPSULE BY MOUTH ONCE WEEKLY  Qty: 4 capsule, Refills: 2             Allergies     She is allergic to latex and ace inhibitors. Physical Exam     INITIAL VITALS: BP: (!) 161/60, Temp: 98.7 °F (37.1 °C), Pulse: 68, Resp: 11, SpO2: 100 %   Physical Exam    DiagnosticResults     EKG   Interpreted in conjunction with emergencydepartment physician No att. providers found  Rhythm: normal sinus   Rate: normal  Axis: normal  Ectopy: none  Conduction: normal  ST Segments: normal  T Waves:normal  Q Waves: none  Clinical Impression: Normal Sinus Rhythm   Comparison:  5/22/18    RADIOLOGY:  No orders to display       LABS:   No results found for this visit on 03/30/21. RECENT VITALS:  BP: 135/62, Temp: 98.3 °F (36.8 °C), Pulse: 90,Resp: 16, SpO2: 96 %       ED Course     Nursing Notes, Past Medical Hx, Past Surgical Hx, Social Hx, Allergies, and Family Hx were reviewed.     The patient was given the followingmedications:  Orders Placed This Encounter   Medications    DISCONTD: oxyCODONE (ROXICODONE) immediate release tablet 5 mg    iopamidol (ISOVUE-370) 76 % injection 80 mL    morphine injection 4 mg    sodium chloride flush 0.9 % injection 10 mL    sodium chloride flush 0.9 % injection 10 mL    0.9 % sodium chloride infusion    OR Linked Order Group     promethazine (PHENERGAN) tablet 12.5 mg     ondansetron (ZOFRAN) injection 4 mg    polyethylene glycol (GLYCOLAX) packet 17 g    OR Linked Order Group     acetaminophen (TYLENOL) tablet 650 mg     acetaminophen (TYLENOL) suppository 650 mg    magnesium sulfate 2000 mg in 50 mL IVPB premix    labetalol (NORMODYNE;TRANDATE) injection 5 mg    morphine (PF) injection 1 mg    naloxone (NARCAN) injection 0.4 mg    0.9 % sodium chloride infusion       CONSULTS:  IP CONSULT TO PRIMARY CARE PROVIDER    MEDICAL DECISION MAKING / ASSESSMENT / Ludin Costa is a 80 y.o. female who presented after becoming dizzy/hypotensive after a liver biopsy. CODE BLUE was called after her blood pressure was noted to be 55/35. An EKG was ordered which showed normal sinus rhythm. Patient labs were ordered by the code team including a CBC, CMP, mag, lactate, troponin and CT abdomen/pelvis. On arrival to the ED, patient was slightly hypertensive, had a nonfocal neurological examination and given absence of cardiac symptoms during event, low suspicion for cardiogenic etiology of syncope. Given the reassuring physical examination, with unremarkable laboratory evaluation, patient most likely had a vasovagal event secondary to pain from liver biopsy after moving. Patient CT abdomen/pelvis showed mild to moderate hemorrhagic ascites from biopsy earlier today, Dr. Irina Duran suggested this is slightly more than he usually sees. Given the CT scan findings of Mark Anthony ascites than expected, patient will be admitted for observation with serial hemoglobins to trend for any active bleeding. This patient was also evaluated by the attending physician. All care plans werediscussed and agreed upon. Clinical Impression     1. Hemorrhage        Disposition     PATIENT REFERRED TO:  No follow-up provider specified.     DISCHARGE MEDICATIONS:  Current Discharge Medication List          DISPOSITION Admitted 03/30/2021 02:24:17 PM     Edgardo Cuellar MD  03/30/21 9152

## 2021-03-30 NOTE — ED NOTES
Pt speaks Welsh, usually is able to ambulate on her own. Pt is [facility] OX[facility].  Pt family member bedside and pt lives with daughter       Asya AC Rawls  03/30/21 0710

## 2021-03-30 NOTE — PROGRESS NOTES
Liver biopsy performed by Kathy Angel MD, Right upper quadrant dressing in tact. 50mcg fentanyl and 1mg versed given without complication.  Pt transported to [facility]

## 2021-03-30 NOTE — PROGRESS NOTES
Pt alert and oriented x4. Arrived to [facility] for discharge from CT biopsy of liver. Daughter at bedside. [place] speaking pt. Offered food and drink, pt refused. Pt lying supine. VSS. Denies needs at this time. Reviewed POC for discharge with daughter and pt.

## 2021-03-30 NOTE — PROGRESS NOTES
Arrived in pt's room to re-evaluate pain. Dressing R side abdomen still CDI, no bulging or bleeding noted. Pt reports it is getting better, unable to rate, but reports is more when she takes breaths. Encouraged sip of liquid, pt again refused. Sat pt up to slight semi fowlers and she reports that makes pain in abdomen worse. Pt refused any pain relief medication including tylenol. Call to [facility] in [address]. Put pt back in supine position. Pt reports she was feeling dizzy. SBP 70s. Jone in [address] on phone and reported BP and pain, rechecked BP on RUE and 55/35. Called rapid response. Pt placed in trendelenberg and IVF started. Pt became diaphoretic, nauseated and dizzy initially. Pt placed on telemetry, SR. Staff arrival. Dressing R abdomen remains CDI. No swelling noted at site. See rapid response charting.

## 2021-03-30 NOTE — ED PROVIDER NOTES
ED Attending Attestation Note     Date of evaluation: 3/30/2021    This patient was seen by the resident. I have seen and examined the patient, agree with the workup, evaluation, management and diagnosis. The care plan has been discussed. I have reviewed the ECG and concur with the resident's interpretation. My assessment reveals a slender, somewhat frail-appearing elderly patient, awake and alert, pleasantly conversational in her native language of [facility], who presents from the PACU, recovering after an IR liver biopsy. There she reportedly sat up, had onset of severe right upper quadrant abdominal pain, became dizzy and hypotensive. A CODE BLUE was called, and the patient was transported to the emergency department. By the time of arrival to the emergency department, she is awake and alert, with normal vital signs, and complaining only of significant right upper quadrant pain. She does have tenderness to palpation in that region. It is believed that the patient likely experienced a vagal episode, but a CT was performed, which was read by radiology as showing perihepatic hemorrhage to a greater degree than would generally be expected after a needle liver biopsy, although without active extravasation. Given this finding, admission for observation and serial hemoglobins was recommended. Farshad Smith MD  04/11/21 2807

## 2021-03-30 NOTE — H&P
Internal Medicine  PGY 1  History & Physical      CC Presyncope    History Obtained From:  patient, electronic medical record    HISTORY OF PRESENT ILLNESS:  Selwyn Crowley is a 80 y.o. female with a PMHx of multiple liver masses and liver biopsy today who presented with syncope from same day surgery after liver biopsy. Patient had liver biopsy today and when in same day surgery whensitting up she suddenly had RUQ pain that radiated to right sholder, became clammy, diaphoretic, nauseus BP was 154/78 and then 55/35 and code blue was called. She did not lose consciousness. By the time the ICU team arrived the BP was 107/38, HR 61, 98% spo2 on room air. She was given 500 cc bolus. Patient was transferred to the ED. In the ED pt vital signs stable with /60, labs were unremarkable including Cr, Hg, and troponin. ECG without any new abnormalities. Lactate was 2.6. CTAP w/wo IV contrast showed a concern for more than expected bleeding for this procedure so the patient will be admitted for observation. Past Medical History:        Diagnosis Date    Breast cancer ([address] 2/14/2012    Diarrhea 7/6/2012    Glaucoma 4/15/2014    H/O: hysterectomy 2/14/2012    History of thyroiditis 08/08/2012    HTN (hypertension) 2/14/2012    Hypothyroidism     IFG (impaired fasting glucose)     Iron deficiency     Liver cyst 8/7/2012    Thalassemia minor     Tonsil stone     Vertigo 5/22/2018    Vitamin D deficiency 4/18/2014   ·     Past Surgical History:        Procedure Laterality Date    BREAST SURGERY      CT NEEDLE BIOPSY LIVER PERCUTANEOUS  3/30/2021    CT NEEDLE BIOPSY LIVER PERCUTANEOUS 3/30/2021 [facility] CT SCAN    HYSTERECTOMY     ·     Medications Priorto Admission:    · Not in a hospital admission. Allergies:  Latex and Ace inhibitors    Social History:   · TOBACCO:   reports that she has never smoked.  She has never used smokeless tobacco.  · ETOH:   reports no history of alcohol use.      Family History:       Problem Relation Age of Onset    Stroke Mother     Heart Disease Father     High Blood Pressure Father    ·     Review of Systems   Constitutional: Positive for diaphoresis. Negative for fever. Respiratory: Negative for shortness of breath. Cardiovascular: Negative for chest pain. Gastrointestinal: Positive for abdominal pain. Negative for vomiting. Genitourinary: Negative for difficulty urinating. Neurological: Positive for light-headedness. Negative for dizziness. Physical exam:       Vitals:    03/30/21 1330   BP: (!) 173/53   Pulse: 80   Resp: 11   Temp:    SpO2: 100%       Physical Exam  Constitutional:       Appearance: Normal appearance. HENT:      Head: Normocephalic and atraumatic. Right Ear: External ear normal.      Left Ear: External ear normal.      Nose: Nose normal.      Mouth/Throat:      Mouth: Mucous membranes are moist.      Pharynx: Oropharynx is clear. Eyes:      Extraocular Movements: Extraocular movements intact. Pupils: Pupils are equal, round, and reactive to light. Cardiovascular:      Rate and Rhythm: Normal rate and regular rhythm. Pulses: Normal pulses. Heart sounds: Normal heart sounds. Pulmonary:      Effort: Pulmonary effort is normal.      Breath sounds: Normal breath sounds. Abdominal:      Palpations: Abdomen is soft. Tenderness: There is abdominal tenderness. Musculoskeletal:      Right lower leg: No edema. Left lower leg: No edema. Skin:     General: Skin is warm and dry. Neurological:      General: No focal deficit present. Mental Status: She is alert and oriented to person, place, and time.          DATA:    Labs:  CBC:   Recent Labs     03/30/21  0840 03/30/21  1135   WBC  --  10.7   HGB  --  11.6*   HCT  --  32.9*    226       BMP:   Recent Labs     03/30/21  1135      K 4.0      CO2 21   BUN 18   CREATININE 0.8   GLUCOSE 157*     LFT's:   Recent Labs 03/30/21  1135   AST 63*   ALT 83*   BILITOT 1.1*   ALKPHOS 324*     Troponin:   Recent Labs     03/30/21  1135   TROPONINI <0.01     BNP:No results for input(s): BNP in the last 72 hours. ABGs: No results for input(s): PHART, CQA9WSP, PO2ART in the last 72 hours. INR:   Recent Labs     03/30/21  0840   INR 1.06       U/A:No results for input(s): NITRITE, COLORU, PHUR, LABCAST, WBCUA, RBCUA, MUCUS, TRICHOMONAS, YEAST, BACTERIA, CLARITYU, SPECGRAV, LEUKOCYTESUR, UROBILINOGEN, BILIRUBINUR, BLOODU, GLUCOSEU, AMORPHOUS in the last 72 hours. Invalid input(s): Bee Crimes    Micro:    Imaging:  No orders to display           ASSESSMENT AND PLAN:  Naa Workman is a 80 y.o. female with a PMHx of multiple liver masses and liver biopsy today who presented with syncope from same day surgery after liver biopsy. Syncopal Episode  Pt with presyncopal episode associated with feeling clammy and hot assocaited with low BP but no LOC and quick recovery of mental status and vital signs. Likely vaso vagal episode from procedure.    - Hg q 6 hrs  - Lactate at 6pm  - Telemetry  - ECG in AM        Will discuss with attending physician Dr. Marlon Montejo Status:Full code  FEN: NPO  PPX: SCD  DISPO: Cortez Buckley MD  3/30/2021,  1:59 PM

## 2021-03-30 NOTE — BRIEF OP NOTE
Brief Postoperative Note    Cheko Keenan  YOB: 1940  [id number]    Pre-operative Diagnosis: Multiple liver masses and pancreatic mass. Suspected metastatic pancreatic cancer in need of liver biopsy. Post-operative Diagnosis: Same    Procedure: CT guided biopsy of right lobe of liver mass.     Anesthesia: Moderate    Surgeons/Assistants: Rigoberto Del Toro    Estimated Blood Loss: Minimal    Complications: none    Specimens: were obtained      Quyen Hallman MD  3/30/2021

## 2021-03-30 NOTE — H&P
IR  H & P      Patient:  Jaleel Salas   :   1940      Relevant patient history reviewed and discussed. CC: Multiple liver masses and pancreatic mass. Suspected metastatic pancreatic cancer in need of liver biopsy. The procedure including risks and benefits was discussed at length with the patient (or designated family member) and all questions were answered. Informed consent to proceed with the procedure was given. Heart : regular rate and rhythm  Lungs : clear, breathing easily  Airway Assessment: Mallampati 2  Condition : stable    Boone Scale: Activity:  2 - Able to move 4 extremities voluntarily on command  Respiration:  2 - Able to breathe deeply and cough freely  Circulation:  2 - BP+/- 20mmHg of normal  Consciousness:  2 - Fully awake  Oxygen Saturation (color):  2 - Able to maintain oxygen saturation >92% on room air      [facility] nurses notes reviewed and agreed. Medications reviewed. Current Outpatient Medications on File Prior to Encounter   Medication Sig Dispense Refill    diclofenac (VOLTAREN) 75 MG EC tablet TAKE ONE TABLET BY MOUTH TWICE A DAY WITH MEALS 60 tablet 0    latanoprost (XALATAN) 0.005 % ophthalmic solution 1 drop nightly      bisoprolol-hydroCHLOROthiazide (ZIAC) 2.5-6.25 MG per tablet TAKE ONE TABLET BY MOUTH TWICE A DAY 60 tablet 2    atorvastatin (LIPITOR) 20 MG tablet Take 0.5 tablets by mouth daily 30 tablet 3    vitamin D (ERGOCALCIFEROL) 82581 UNITS CAPS capsule TAKE ONE CAPSULE BY MOUTH ONCE WEEKLY 4 capsule 2     No current facility-administered medications on file prior to encounter. Allergies:    Allergies   Allergen Reactions    Latex Rash    Ace Inhibitors Other (See Comments)     Cough     Sedation : Moderate sedation planned  ASA 2 - Patient with mild systemic disease with no functional limitations

## 2021-03-30 NOTE — SIGNIFICANT EVENT
CODE BLUE was called at 11:12AM at same day surgery room 7. Per nurse, patient just underwent liver biopsy and was laying down in her bed. When she tried sitting the patient up, the patient stated that she had severe pain in her RUQ that radiated up to her right shoulder. At that point, she then became very clammy and diaphoretic. Her blood pressure initially was at 154/78 and had dropped to 55/35. [facility]WS was then called. Upon arrival, patient had a pulse and was responsive. Vital signs: /38, HR 61, RR 20, SpO2 99%. EKG revealed NSR. Poct glucose was 139. Abdominal exam revealed tenderness in her RUQ. Patient was given a 500 cc bolus of NS.  CBC, BMP, Mg, troponin levels and lactic acid were ordered. CT abdomen w/wo contrast was also ordered to r/o any GI bleed. Patient will be transferred to ED for further monitoring and potential observation. ICU team will continue to follow.       Juan Christian MD, PGY-1  Internal Medicine

## 2021-03-30 NOTE — ED NOTES
Bed: 1TR-01  Expected date:   Expected time:   Means of arrival:   Comments:  Same day     Charly Guzmán RN  03/30/21 1713

## 2021-03-31 LAB
ANION GAP SERPL CALCULATED.3IONS-SCNC: 14 MMOL/L (ref 3–16)
BASOPHILS ABSOLUTE: 0 K/UL (ref 0–0.2)
BASOPHILS RELATIVE PERCENT: 0.4 %
BUN BLDV-MCNC: 15 MG/DL (ref 7–20)
CALCIUM SERPL-MCNC: 8.9 MG/DL (ref 8.3–10.6)
CHLORIDE BLD-SCNC: 102 MMOL/L (ref 99–110)
CO2: 22 MMOL/L (ref 21–32)
CREAT SERPL-MCNC: 0.7 MG/DL (ref 0.6–1.2)
EKG ATRIAL RATE: 92 BPM
EKG DIAGNOSIS: NORMAL
EKG P AXIS: 74 DEGREES
EKG P-R INTERVAL: 150 MS
EKG Q-T INTERVAL: 370 MS
EKG QRS DURATION: 80 MS
EKG QTC CALCULATION (BAZETT): 457 MS
EKG R AXIS: 55 DEGREES
EKG T AXIS: 84 DEGREES
EKG VENTRICULAR RATE: 92 BPM
EOSINOPHILS ABSOLUTE: 0.1 K/UL (ref 0–0.6)
EOSINOPHILS RELATIVE PERCENT: 1.2 %
GFR AFRICAN AMERICAN: >60
GFR NON-AFRICAN AMERICAN: >60
GLUCOSE BLD-MCNC: 117 MG/DL (ref 70–99)
HCT VFR BLD CALC: 29 % (ref 36–48)
HCT VFR BLD CALC: 29.9 % (ref 36–48)
HEMOGLOBIN: 10.1 G/DL (ref 12–16)
HEMOGLOBIN: 9.9 G/DL (ref 12–16)
LYMPHOCYTES ABSOLUTE: 1.5 K/UL (ref 1–5.1)
LYMPHOCYTES RELATIVE PERCENT: 18.5 %
MCH RBC QN AUTO: 29.9 PG (ref 26–34)
MCHC RBC AUTO-ENTMCNC: 34.2 G/DL (ref 31–36)
MCV RBC AUTO: 87.6 FL (ref 80–100)
MONOCYTES ABSOLUTE: 0.8 K/UL (ref 0–1.3)
MONOCYTES RELATIVE PERCENT: 9.8 %
NEUTROPHILS ABSOLUTE: 5.5 K/UL (ref 1.7–7.7)
NEUTROPHILS RELATIVE PERCENT: 70.1 %
PDW BLD-RTO: 13.1 % (ref 12.4–15.4)
PLATELET # BLD: 185 K/UL (ref 135–450)
PMV BLD AUTO: 10 FL (ref 5–10.5)
POTASSIUM REFLEX MAGNESIUM: 3.7 MMOL/L (ref 3.5–5.1)
RBC # BLD: 3.31 M/UL (ref 4–5.2)
SODIUM BLD-SCNC: 138 MMOL/L (ref 136–145)
WBC # BLD: 7.9 K/UL (ref 4–11)

## 2021-03-31 PROCEDURE — 93005 ELECTROCARDIOGRAM TRACING: CPT | Performed by: STUDENT IN AN ORGANIZED HEALTH CARE EDUCATION/TRAINING PROGRAM

## 2021-03-31 PROCEDURE — 93010 ELECTROCARDIOGRAM REPORT: CPT | Performed by: INTERNAL MEDICINE

## 2021-03-31 PROCEDURE — 1200000000 HC SEMI PRIVATE

## 2021-03-31 PROCEDURE — 85025 COMPLETE CBC W/AUTO DIFF WBC: CPT

## 2021-03-31 PROCEDURE — 2580000003 HC RX 258: Performed by: STUDENT IN AN ORGANIZED HEALTH CARE EDUCATION/TRAINING PROGRAM

## 2021-03-31 PROCEDURE — 80048 BASIC METABOLIC PNL TOTAL CA: CPT

## 2021-03-31 PROCEDURE — 36415 COLL VENOUS BLD VENIPUNCTURE: CPT

## 2021-03-31 PROCEDURE — 85018 HEMOGLOBIN: CPT

## 2021-03-31 PROCEDURE — 85014 HEMATOCRIT: CPT

## 2021-03-31 PROCEDURE — 99222 1ST HOSP IP/OBS MODERATE 55: CPT | Performed by: INTERNAL MEDICINE

## 2021-03-31 RX ORDER — BISOPROLOL FUMARATE AND HYDROCHLOROTHIAZIDE 2.5; 6.25 MG/1; MG/1
1 TABLET ORAL DAILY
Status: DISCONTINUED | OUTPATIENT
Start: 2021-03-31 | End: 2021-04-01 | Stop reason: HOSPADM

## 2021-03-31 RX ORDER — BISOPROLOL FUMARATE AND HYDROCHLOROTHIAZIDE 2.5; 6.25 MG/1; MG/1
1 TABLET ORAL DAILY
Qty: 30 TABLET | Refills: 3 | Status: SHIPPED | OUTPATIENT
Start: 2021-04-01 | End: 2021-04-01

## 2021-03-31 RX ORDER — LATANOPROST 50 UG/ML
1 SOLUTION/ DROPS OPHTHALMIC NIGHTLY
Status: DISCONTINUED | OUTPATIENT
Start: 2021-03-31 | End: 2021-04-01 | Stop reason: HOSPADM

## 2021-03-31 RX ADMIN — SODIUM CHLORIDE: 9 INJECTION, SOLUTION INTRAVENOUS at 02:20

## 2021-03-31 RX ADMIN — BISOPROLOL FUMARATE AND HYDROCHLOROTHIAZIDE 1 TABLET: 2.5; 6.25 TABLET ORAL at 12:30

## 2021-03-31 ASSESSMENT — PAIN DESCRIPTION - FREQUENCY
FREQUENCY: INTERMITTENT

## 2021-03-31 ASSESSMENT — PAIN DESCRIPTION - LOCATION
LOCATION: ABDOMEN

## 2021-03-31 ASSESSMENT — PAIN DESCRIPTION - DESCRIPTORS
DESCRIPTORS: ACHING

## 2021-03-31 ASSESSMENT — PAIN DESCRIPTION - PAIN TYPE
TYPE: SURGICAL PAIN

## 2021-03-31 ASSESSMENT — PAIN SCALES - GENERAL
PAINLEVEL_OUTOF10: 4
PAINLEVEL_OUTOF10: 0
PAINLEVEL_OUTOF10: 3
PAINLEVEL_OUTOF10: 7
PAINLEVEL_OUTOF10: 7
PAINLEVEL_OUTOF10: 5
PAINLEVEL_OUTOF10: 7

## 2021-03-31 ASSESSMENT — PAIN - FUNCTIONAL ASSESSMENT
PAIN_FUNCTIONAL_ASSESSMENT: ACTIVITIES ARE NOT PREVENTED
PAIN_FUNCTIONAL_ASSESSMENT: PREVENTS OR INTERFERES SOME ACTIVE ACTIVITIES AND ADLS
PAIN_FUNCTIONAL_ASSESSMENT: ACTIVITIES ARE NOT PREVENTED

## 2021-03-31 ASSESSMENT — PAIN DESCRIPTION - ORIENTATION
ORIENTATION: UPPER;RIGHT
ORIENTATION: RIGHT;UPPER
ORIENTATION: UPPER;RIGHT
ORIENTATION: RIGHT;UPPER
ORIENTATION: UPPER;RIGHT

## 2021-03-31 ASSESSMENT — ENCOUNTER SYMPTOMS
NAUSEA: 0
DIARRHEA: 0
CONSTIPATION: 0
BLOOD IN STOOL: 0
ABDOMINAL PAIN: 1
RESPIRATORY NEGATIVE: 1
VOMITING: 0

## 2021-03-31 ASSESSMENT — PAIN DESCRIPTION - ONSET
ONSET: OTHER (COMMENT)
ONSET: ON-GOING

## 2021-03-31 ASSESSMENT — PAIN DESCRIPTION - PROGRESSION: CLINICAL_PROGRESSION: NOT CHANGED

## 2021-03-31 NOTE — CARE COORDINATION
Patients can then  the prescription on their way out of the hospital at discharge, or pharmacy can deliver to the bedside if staff is available. (payment due at time of pick-up or delivery - cash, check, or card accepted)     Able to afford home medications/ co-pay costs: Yes    ADLS:  Current PT AM-PAC Score:   /24  Current OT AM-PAC Score:   /24      Discharge Disposition: Home- No Services Needed    LOC at discharge: Not Applicable  WILSON Completed: Not Indicated    Notification completed in HENS/PAS?:  Not Applicable    IMM Completed:   Not Indicated    Transportation:  Transportation Plan for discharge: family   Mode of Transport: Private Car    Transportation form completed: Not Indicated    Home Care:  Home Care ordered at discharge: Non-skilled care already in place       [facility]:  DME Provider: n/a   Equipment obtained during hospitalization: no new DME     Home Oxygen and Respiratory Equipment:  Oxygen needed at discharge?: Not Indicated    Dialysis:  Dialysis patient: No    Referrals made at [facility] for outpatient continued care:  Not Applicable    Additional CM Notes:   SW spoke with patient and daughter at bedside this AM. Patient to return home. Daughter provides support and care needs. Patient is active with [employer] and has home care aides coming into the home to help with care. Patient has a case manger through [employer]. Daughter to transport patient  Home today. No new skilled needs at this time. The Plan for Transition of Care is related to the following treatment goals Pre-syncope [R55]      The Patient and/or patient representative Patient  was provided with a choice of provider and agrees with the discharge plan Yes    Freedom of choice list was provided with basic dialogue that supports the patient's individualized plan of care/goals and shares the quality data associated with the providers.  Yes    Care Transitions patient: No    Benjamin Sandra, BINA  [facility]  Case Management Department  Ph: [phone]

## 2021-03-31 NOTE — DISCHARGE SUMMARY
INTERNAL MEDICINE    RESIDENT DISCHARGE SUMMARY   Discharge Summaries      Patient ID: Josh Shi   Gender: female      :  1940  AGE: 80 y.o. MRN:  [id number]  Code Status: Full Code ***  PCP: Mary Ann Chauhan MD    Admit date:  3/30/2021      Discharge date:  No discharge date for patient encounter. Admitting Physician:  Mary Ann Chauhan MD    Discharge Physician: Fredy Santamaria     Admission Condition:  {condition:58700}  Discharged Condition:  {condition:07405} Kathleen8 Ev Avila Patient Condition:[id number]}    Discharge Diagnoses:  -Presyncopal episode  -Multiple liver masses     Active Hospital Problems    Diagnosis Date Noted    Hemorrhage [R58]     Pre-syncope [R55] 2021       The patient was seen and examined on day of discharge and this discharge summary is in conjunction with any daily progress note from day of discharge. In the event that the patient is not discharged on the daily of the discharge summary being filed, it is to serve as the progress note for that day. Hospital Course:   Rhea Rodriguez a 81yo F with PMH of breast cancer with multiple liver and pancreatic masses (suspected metastasis) who presented with presyncope from same day surgery s/p liver biopsy. Pt had hypotensive episode and code blue was called and was transferred to ED. Due to CTAP showing somewhat concerning amount of blood, pt was admitted for observation. Pt complained of inability to urinate and bladder scan showed urinary retention. Straight cath was performed with improvement in symptoms. Pt was restarted on home Ziac. On stabilization of patient's clinical condition and labs, pt was discharged home with her daughter. On the date of discharge, the patient reported feeling stable. Pt denies fever, chills, headaches, chest pain, and dyspnea. The patient was found to not be in any acute distress, with vital signs within normal limits, and no new abnormalities on physical examination.  Further, the patient expressed appropriate understanding of, and agreement with, the discharge recommendations, medications, and plan. Disposition:  {DISPOSITIONS:142443774}    Physical Exam Performed:     BP (!) 151/52   Pulse 105   Temp 99.1 °F (37.3 °C) (Oral)   Resp 16   Ht 5' 2\" (1.575 m)   Wt 125 lb 10.6 oz (57 kg)   SpO2 95%   BMI 22.98 kg/m²     Physical Exam  Constitutional:       Appearance: Normal appearance. HENT:      Head: Normocephalic and atraumatic. Cardiovascular:      Rate and Rhythm: Normal rate and regular rhythm. Pulses: Normal pulses. Heart sounds: Normal heart sounds. Pulmonary:      Effort: Pulmonary effort is normal.      Breath sounds: Normal breath sounds. Abdominal:      General: Bowel sounds are normal.      Palpations: Abdomen is soft. Tenderness: There is abdominal tenderness. Skin:     General: Skin is warm and dry. Capillary Refill: Capillary refill takes less than 2 seconds. Neurological:      Mental Status: She is alert and oriented to person, place, and time. Labs:  For convenience and continuity at follow-up the following most recent labs are provided:      CBC:    Lab Results   Component Value Date    WBC 7.9 03/31/2021    HGB 10.1 03/31/2021    HCT 29.9 03/31/2021     03/31/2021       Renal:    Lab Results   Component Value Date     03/31/2021    K 3.7 03/31/2021     03/31/2021    CO2 22 03/31/2021    BUN 15 03/31/2021    CREATININE 0.7 03/31/2021    CALCIUM 8.9 03/31/2021         Significant Diagnostic Studies    Radiology:   No orders to display          Consults:     IP CONSULT TO PRIMARY CARE PROVIDER      Discharge Instructions/Follow-up:  ***    Activity: activity as tolerated    Diet: {diet:43837}    Discharge Medications:     Current Discharge Medication List           Details   latanoprost (XALATAN) 0.005 % ophthalmic solution 1 drop nightly      bisoprolol-hydroCHLOROthiazide (ZIAC) 2.5-6.25 MG per tablet TAKE ONE TABLET BY MOUTH TWICE A DAY  Qty: 60 tablet, Refills: 2    Associated Diagnoses: Essential hypertension      diclofenac (VOLTAREN) 75 MG EC tablet TAKE ONE TABLET BY MOUTH TWICE A DAY WITH MEALS  Qty: 60 tablet, Refills: 0      vitamin D (ERGOCALCIFEROL) 80923 UNITS CAPS capsule TAKE ONE CAPSULE BY MOUTH ONCE WEEKLY  Qty: 4 capsule, Refills: 2             Time Spent on discharge is more than {Time; 15 min - 8 hours:09344} in the examination, evaluation, counseling and review of medications and discharge plan. Signed:    Zeenat Peña, MS4  3/31/2021      Thank you Rocky Guevara MD for the opportunity to be involved in this patient's care. If you have any questions or concerns please feel free to contact me.

## 2021-03-31 NOTE — PROGRESS NOTES
Progress Note    Admit Date: 3/30/2021  Day: 2  Diet: DIET FULL LIQUID;    CC: Presyncope    Interval history: Last night, pt was complaining of inability to urinate all day and felt pressure in her lower abdomen. Pt was found to be distended and bladder scan showed 850mL in bladder. Pt was straight cathed with improvement. Otherwise, no acute events overnight. Pt doing well this AM.  Pt states she doesn't feel any pain when she lays flat but has some pain with deep inspiration and movement. Overall, she says she feels much better than yesterday and is in less pain today. HPI:   Fernanda Mayorga is a 81yo F with PMH of breast cancer with multiple liver and pancreatic masses (suspected metastasis) who presented with presyncope from same day surgery s/p liver biopsy.     Patient had liver biopsy today and when in same day surgery whensitting up she suddenly had RUQ pain that radiated to right sholder, became clammy, diaphoretic, nauseus BP was 154/78 and then 55/35 and code blue was called. She did not lose consciousness. By the time the ICU team arrived the BP was 107/38, HR 61, 98% spo2 on room air. She was given 500 cc bolus. Patient was transferred to the ED.      In the ED pt vital signs stable with /60, labs were unremarkable including Cr, Hg, and troponin. ECG without any new abnormalities. Lactate was 2.6. CTAP w/wo IV contrast showed a concern for more than expected bleeding for this procedure so the patient will be admitted for observation.      Medications:     Scheduled Meds:   bisoprolol-hydroCHLOROthiazide  1 tablet Oral Daily    sodium chloride flush  10 mL Intravenous 2 times per day     Continuous Infusions:   sodium chloride      sodium chloride 100 mL/hr at 03/31/21 0220     PRN Meds:sodium chloride flush, sodium chloride, promethazine **OR** ondansetron, polyethylene glycol, acetaminophen **OR** acetaminophen, labetalol, morphine, naloxone    Objective:   Vitals:   T-max:  Patient Vitals for the past 8 hrs:   BP Temp Temp src Pulse Resp SpO2   03/31/21 0802 (!) 151/52 99.1 °F (37.3 °C) Oral 105 16 95 %   03/31/21 0405 126/68 98.5 °F (36.9 °C) Oral 97 16 95 %       Intake/Output Summary (Last 24 hours) at 3/31/2021 1006  Last data filed at 3/31/2021 0405  Gross per 24 hour   Intake 691 ml   Output 874 ml   Net -183 ml       Review of Systems   Constitutional: Negative. Respiratory: Negative. Cardiovascular: Negative. Gastrointestinal: Positive for abdominal pain. Negative for blood in stool, constipation, diarrhea, nausea and vomiting. Genitourinary: Positive for difficulty urinating. Negative for dysuria, frequency, hematuria, pelvic pain and urgency. Neurological: Negative. Physical Exam  Constitutional:       Appearance: Normal appearance. HENT:      Head: Normocephalic and atraumatic. Cardiovascular:      Rate and Rhythm: Normal rate and regular rhythm. Pulses: Normal pulses. Heart sounds: Normal heart sounds. Pulmonary:      Effort: Pulmonary effort is normal.      Breath sounds: Normal breath sounds. Abdominal:      General: Bowel sounds are normal.      Palpations: Abdomen is soft. Tenderness: There is abdominal tenderness. Musculoskeletal:      Right lower leg: No edema. Left lower leg: No edema. Skin:     General: Skin is warm and dry. Capillary Refill: Capillary refill takes less than 2 seconds. Neurological:      General: No focal deficit present. Mental Status: She is alert and oriented to person, place, and time.          LABS:    CBC:   Recent Labs     03/30/21  0840 03/30/21  1135 03/30/21  2102 03/31/21  0441   WBC  --  10.7  --  7.9   HGB  --  11.6* 10.5* 9.9*   HCT  --  32.9* 31.2* 29.0*    226  --  185   MCV  --  85.5  --  87.6     Renal:    Recent Labs     03/30/21  1135 03/31/21  0441    138   K 4.0 3.7    102   CO2 21 22   BUN 18 15   CREATININE 0.8 0.7   GLUCOSE 157* 117*   CALCIUM 9.0 8.9

## 2021-03-31 NOTE — PROGRESS NOTES
Pt complaining of inability to urinate all day and states that she feels pressure. Pt is distended and bladder scanned with 850ml in bladder. Pt states that she is unable to urinate it out. Messaged Dr. Josafat Oquendo with orders to straight cath. Will continue to monitor.

## 2021-03-31 NOTE — DISCHARGE SUMMARY
INTERNAL MEDICINE    RESIDENT DISCHARGE SUMMARY   Discharge Summaries      Patient ID: Gian Nevarez   Gender: female      :  1940  AGE: 80 y.o. MRN:  [id number]  Code Status: Full Code   PCP: Rocky Guevara MD    Admit date:  3/30/2021      Discharge date:  No discharge date for patient encounter. Admitting Physician:  Rocky Guevara MD    Discharge Physician: Eulalio Dacosta MD     Admission Condition:  poor  Discharged Condition:  stable Stable    Discharge Diagnoses:  -Presyncopal episode likely vasovagal  -Hx of breast cancer with multiple liver and pancreatic masses  -HTN  -Glaucoma     Active Hospital Problems    Diagnosis Date Noted    Hemorrhage [R58]     Pre-syncope [R55] 2021       The patient was seen and examined on day of discharge and this discharge summary is in conjunction with any daily progress note from day of discharge. In the event that the patient is not discharged on the daily of the discharge summary being filed, it is to serve as the progress note for that day. Hospital Course:   Noé Hinojosa a 81yo F with PMH of breast cancer with multiple liver and pancreatic masses (suspected metastasis) who presented with presyncope from same day surgery s/p liver biopsy. Pt had hypotensive episode and code blue was called for decreased responsiveness (no pulse loss). Hypotensive episode thought to be vasovagal, she responded with fluid resuscitation. CTAP in ED showing somewhat concerning amount of blood more than usual for liver biopsy, and pt was admitted on 3/31 for observation. Hgb and Hct remained stable during admission. Pt complained of inability to urinate and bladder scan showed urinary retention. Straight cath was performed with improvement in symptoms. BP improved, fluids stopped, and pt was restarted on home Ziac at half dose (2.5mg daily). Pt improved clinically during admission.  She endorsed pain and discomfort around site of biopsy and refused attempts at pain control. Prior to leaving, she was able to demonstrated ability to walk independently. On stabilization of patient's clinical condition and labs, pt was discharged home on 3/31 with her daughter. On the date of discharge, the patient reported feeling stable. Pt denies fever, chills, headaches, chest pain, and dyspnea. The patient was found to not be in any acute distress, with vital signs within normal limits, and no new abnormalities on physical examination. Further, the patient expressed appropriate understanding of, and agreement with, the discharge recommendations, medications, and plan. Disposition:  Home    Physical Exam Performed:     BP (!) 151/52   Pulse 108   Temp 98.3 °F (36.8 °C) (Oral)   Resp 18   Ht 5' 2\" (1.575 m)   Wt 125 lb 10.6 oz (57 kg)   SpO2 98%   BMI 22.98 kg/m²     Physical Exam  Constitutional:       Appearance: Normal appearance. HENT:      Head: Normocephalic and atraumatic. Cardiovascular:      Rate and Rhythm: Normal rate and regular rhythm. Pulses: Normal pulses. Heart sounds: Normal heart sounds. Pulmonary:      Effort: Pulmonary effort is normal.      Breath sounds: Normal breath sounds. Abdominal:      General: Bowel sounds are normal.      Palpations: Abdomen is soft. Tenderness: There is abdominal tenderness. Skin:     General: Skin is warm and dry. Capillary Refill: Capillary refill takes less than 2 seconds. Neurological:      Mental Status: She is alert and oriented to person, place, and time. Labs:  For convenience and continuity at follow-up the following most recent labs are provided:      CBC:    Lab Results   Component Value Date    WBC 7.9 03/31/2021    HGB 10.1 03/31/2021    HCT 29.9 03/31/2021     03/31/2021       Renal:    Lab Results   Component Value Date     03/31/2021    K 3.7 03/31/2021     03/31/2021    CO2 22 03/31/2021    BUN 15 03/31/2021    CREATININE 0.7 03/31/2021    CALCIUM 8.9 03/31/2021         Significant Diagnostic Studies    Radiology:   No orders to display          Consults:     [facility] PROVIDER      Discharge Instructions/Follow-up:    -Follow-up with Dr. Steve Duvall in 1 week. -Be sure to follow-up for biopsy results.  -Take ziac at half the regular dose (2.5 mg daily) as prescribed  -Drink plenty of fluids. Use caution with walking. Advance diet as tolerated. Activity: activity as tolerated    Diet: Full liquid diet, advance as tolerated    Discharge Medications:     Current Discharge Medication List           Details   bisoprolol-hydroCHLOROthiazide (ZIAC) 2.5-6.25 MG per tablet Take 1 tablet by mouth daily  Qty: 30 tablet, Refills: 3              Details   latanoprost (XALATAN) 0.005 % ophthalmic solution 1 drop nightly      diclofenac (VOLTAREN) 75 MG EC tablet TAKE ONE TABLET BY MOUTH TWICE A DAY WITH MEALS  Qty: 60 tablet, Refills: 0      vitamin D (ERGOCALCIFEROL) 04518 UNITS CAPS capsule TAKE ONE CAPSULE BY MOUTH ONCE WEEKLY  Qty: 4 capsule, Refills: 2             Time Spent on discharge is more than 30 minutes in the examination, evaluation, counseling and review of medications and discharge plan. Signed:    Dayna Sinha, MS4    Lux Sunshine MD, PGY-1  3/31/2021      Thank you Sarah Colvin MD for the opportunity to be involved in this patient's care. If you have any questions or concerns please feel free to contact me.

## 2021-04-01 VITALS
TEMPERATURE: 97.7 F | BODY MASS INDEX: 23.12 KG/M2 | DIASTOLIC BLOOD PRESSURE: 72 MMHG | SYSTOLIC BLOOD PRESSURE: 157 MMHG | OXYGEN SATURATION: 97 % | WEIGHT: 125.66 LBS | HEIGHT: 62 IN | HEART RATE: 91 BPM | RESPIRATION RATE: 17 BRPM

## 2021-04-01 PROCEDURE — 2580000003 HC RX 258: Performed by: STUDENT IN AN ORGANIZED HEALTH CARE EDUCATION/TRAINING PROGRAM

## 2021-04-01 PROCEDURE — 99238 HOSP IP/OBS DSCHRG MGMT 30/<: CPT | Performed by: INTERNAL MEDICINE

## 2021-04-01 RX ORDER — BISOPROLOL FUMARATE AND HYDROCHLOROTHIAZIDE 2.5; 6.25 MG/1; MG/1
1 TABLET ORAL 2 TIMES DAILY
Qty: 30 TABLET | Refills: 3 | Status: SHIPPED | OUTPATIENT
Start: 2021-04-01 | End: 2021-05-05 | Stop reason: DRUGHIGH

## 2021-04-01 RX ADMIN — Medication 10 ML: at 08:20

## 2021-04-01 RX ADMIN — BISOPROLOL FUMARATE AND HYDROCHLOROTHIAZIDE 1 TABLET: 2.5; 6.25 TABLET ORAL at 10:56

## 2021-04-01 ASSESSMENT — PAIN SCALES - GENERAL: PAINLEVEL_OUTOF10: 0

## 2021-04-01 NOTE — PROGRESS NOTES
Physician Progress Note      Eladio Shipley  [facility] #:                  [id number]  :                       1940  ADMIT DATE:       3/30/2021 11:49 AM  DISCH DATE:  RESPONDING  PROVIDER #:        Joselyn Burrows MD          QUERY TEXT:    Patient admitted with vasovagal syncope following liver bx and noted to have   \"orthostats positive\". CT shows hemorrhagic ascites, HGB baseline: 13.5   (), to 9.9 (3/31). If possible, please document in discharge summary if   you are evaluating and/or treating any of the following: The medical record reflects the following:  Risk Factors: hemorrhagic ascites  Clinical Indicators: HGB: 11.6- 10.5- 9.9- 10.1, HGB 13.5 on .   Discharge postponed 1 day d/t positive orthostatics. Per pn's had hypotensive   episode following liver bx; Per discharge summary: \"On 3/31, orthostats   positive, kept  for weakness and working of strength and hydration\"  Treatment: NS @ 100/hr, Q6 hr H&H, Admit from OBS status, 500 cc bolus in SDS   recovery  Options provided:  -- Syncope due to orthostatic hypotension secondary to acute blood loss anemia  -- Syncope due to other hypotension  -- Syncope due to other, Please specify. -- Other - I will add my own diagnosis  -- Disagree - Not applicable / Not valid  -- Disagree - Clinically unable to determine / Unknown  -- Refer to Clinical Documentation Reviewer    PROVIDER RESPONSE TEXT:    The syncope is due to other hypotension. Query created by:  Juma Coronado on 2021 12:58 PM      Electronically signed by:  Joselyn Burrows MD 2021 2:57 PM

## 2021-04-01 NOTE — PROGRESS NOTES
Discharge instructions gone over with patient and daughter. Declined  services. Educated on new medications and side effects. Verbalized understanding with no further questions. IV removed with no complications. Tele removed. Dressing to biopsy site on RLQ changed and is clean, dry, and intact. Left floor via wheelchair with RN and all personal belongings.

## 2021-04-01 NOTE — DISCHARGE SUMMARY
INTERNAL MEDICINE    RESIDENT DISCHARGE SUMMARY   Discharge Summaries      Patient ID: Patty Brock   Gender: female      :  1940  AGE: 80 y.o. MRN:  [id number]  Code Status: Full Code   PCP: Hank Ramírez MD    Admit date:  3/30/2021      Discharge date:  No discharge date for patient encounter. 21    Admitting Physician:  Hank Ramírez MD    Discharge Physician: Frederick Hagan MD     Admission Condition:  poor  Discharged Condition:  stable Stable    Discharge Diagnoses:  -Presyncopal episode likely vasovagal  -Hx of breast cancer with multiple liver and pancreatic masses  -HTN  -Glaucoma     Active Hospital Problems    Diagnosis Date Noted    Hemorrhage [R58]     Pre-syncope [R55] 2021       The patient was seen and examined on day of discharge and this discharge summary is in conjunction with any daily progress note from day of discharge. In the event that the patient is not discharged on the daily of the discharge summary being filed, it is to serve as the progress note for that day. Hospital Course:   Justus Edwards a 81yo F with PMH of breast cancer with multiple liver and pancreatic masses (suspected metastasis) who presented with presyncope from same day surgery s/p liver biopsy. Pt had hypotensive episode and code blue was called for decreased responsiveness (no pulse loss). Hypotensive episode thought to be vasovagal, she responded with fluid resuscitation. CTAP in ED showing somewhat concerning amount of blood more than usual for liver biopsy, and pt was admitted on 3/31 for observation. Hgb and Hct remained stable during admission. Pt complained of inability to urinate and bladder scan showed urinary retention. Straight cath was performed with improvement in symptoms. BP improved, fluids stopped, and pt was restarted on home Ziac at half dose (2.5mg daily). Pt improved clinically during admission.  She endorsed pain and discomfort around site of biopsy and refused attempts at pain control. Prior to leaving, she was able to demonstrated ability to walk independently. On 3/31, orthostats positive with respect to heart rate, documented as supine [ 169/70, 113], sitting [171/80, 141], standing [158/80, 158]. Pt kept for another for weakness and working of strength and hydration. On stabilization of patient's clinical condition and labs, pt was discharged home on 4/1 with her daughter. On the date of discharge, the patient reported feeling stable. Pt denies fever, chills, headaches, chest pain, and dyspnea. The patient was found to not be in any acute distress, with vital signs within normal limits, and no new abnormalities on physical examination. Further, the patient expressed appropriate understanding of, and agreement with, the discharge recommendations, medications, and plan. Disposition:  Home    Physical Exam Performed:     BP (!) 142/55   Pulse 100   Temp 98.2 °F (36.8 °C) (Oral)   Resp 18   Ht 5' 2\" (1.575 m)   Wt 125 lb 10.6 oz (57 kg)   SpO2 93%   BMI 22.98 kg/m²     Physical Exam  Constitutional:       Appearance: Normal appearance. HENT:      Head: Normocephalic and atraumatic. Cardiovascular:      Rate and Rhythm: Normal rate and regular rhythm. Pulses: Normal pulses. Heart sounds: Normal heart sounds. Pulmonary:      Effort: Pulmonary effort is normal.      Breath sounds: Normal breath sounds. Abdominal:      General: Bowel sounds are normal.      Palpations: Abdomen is soft. Tenderness: There is abdominal tenderness. Skin:     General: Skin is warm and dry. Capillary Refill: Capillary refill takes less than 2 seconds. Neurological:      Mental Status: She is alert and oriented to person, place, and time. Labs:  For convenience and continuity at follow-up the following most recent labs are provided:      CBC:    Lab Results   Component Value Date    WBC 7.9 03/31/2021    HGB 10.1 03/31/2021    HCT

## 2021-04-01 NOTE — PROGRESS NOTES
Pt resting with eyes closed at this time. RR easy and unlabored. Bed locked and in lowest position. Pt updated on care plan/status. Will continue to monitor.

## 2021-04-01 NOTE — PROGRESS NOTES
Sent the following message to Dr. Hayde Gaona via [employer]    [facility] or Facility: [place] [address] From: Brittanie Jacome RE: Wanda Carroll 1940 RM: [id number] The ortho stats are as follows: supine: 169/70; pulse 113; Sittin/80; pulse 141; Standin/80; pulse 158 Patient c/o mild, dull pain in the RUQ / RLQ. Patient denies dizziness, but demonstrated a weak gait. Patient c/o heart racing. Patient recently received her beta blocker. Would you like PT/OT to be consulted? Thank you.  Need Callback: NEEDS CALLBACK 6 Kitty Lenz Tele ROUTINE  Read 12:37 PM

## 2021-04-01 NOTE — CARE COORDINATION
Case Management Assessment            Discharge Note                    Date / Time of Note: 4/1/2021 1:03 PM                  Discharge Note Completed by: Paco Mcclure    Patient Name: James Wiley   YOB: 1940  Diagnosis: Pre-syncope [R55]   Date / Time: 3/30/2021 11:49 AM    Current PCP: Fely Tariq MD  Clinic patient: No    Hospitalization in the last 30 days: No    Advance Directives:  Code Status: Full Code  [place] DNR form completed and on chart: No    Financial:  Payor: Shaggy Nassaring / Plan: 40 [facility] DEPT OF JOB / Product Type: *No Product type* /      Pharmacy:    [facility] [address] [facility] , [address] [place] [address] [phone] Unknown Flip [phone]  [address] [address]  Phone: [phone] Fax: [phone]    [address] [facility] [address] [phone] Unknown Flip [phone]  [address] [facility] [address]  [address] [facility],[address] [address]  Phone: [phone] Fax: [address] [facility] [address] [facility] [phone] Unknown Flip [phone]  [address] [facility] [address] [facility] [address] [address]  Phone: [phone] Fax: [phone]      Assistance purchasing medications?:    Assistance provided by Case Management: None at this time    Does patient want to participate in local refill/ meds to beds program?:      Meds To Beds General Rules:  1. Can ONLY be done Monday- Friday between 8:30am-5pm  2. Prescription(s) must be in pharmacy by 3pm to be filled same day  3. Copy of patient's insurance/ prescription drug card and patient face sheet must be sent along with the prescription(s)  4. Cost of Rx cannot be added to hospital bill. If financial assistance is needed, please contact unit  or ;  or  CANNOT provide pharmacy voucher for patients co-pays  5.  Patients can then  the prescription on their way out of the hospital at discharge, or pharmacy can deliver to the bedside if staff is available. (payment due at time of pick-up or delivery - cash, check, or card accepted)     Able to afford home medications/ co-pay costs: Yes    ADLS:  Current PT AM-PAC Score:   /24  Current OT AM-PAC Score:   /24      DISCHARGE Disposition: Home- No Services Needed    LOC at discharge: Not Applicable  WILSON Completed: No    Notification completed in HENS/PAS?:  Not Applicable    IMM Completed:   Not Indicated    Transportation:  Transportation PLAN for discharge: family   Mode of Transport: Private Car  Reason for medical transport: Not Applicable  Name of [address] [place] [address],[address]: Not Applicable  Time of Transport: when dgtr available    Transport form completed: No    Home Care:  [address] ordered at discharge: No  [address]: Not Applicable  Orders faxed: No    Durable Medical Equipment:  DME Provider: JJ  Equipment obtained during hospitalization: NA    Home Oxygen and Respiratory Equipment:  Oxygen needed at discharge?: No  [address]: Not Applicable  Portable tank available for discharge?: Not Indicated    Dialysis:  Dialysis patient: No    Dialysis Center:  Not Applicable    Hospice Services:  Location: Not Applicable  Agency: Not Applicable    Consents signed: Not Indicated    Referrals made at [facility] for outpatient continued care:  Not Applicable    Additional CM Notes:  CM confirmed  D/c home w/ family :     Patient to return home. Daughter provides support and care needs. Patient is active with [employer] and has home care aides coming into the home to help with care. Patient has a case manger through [employer]. Daughter to transport patient  Home today. No new skilled needs at this time.      New Rx:  E scribed  To own pharmacy :      these medications from [address] [place] [address] Arturo Christian Al [facility] [phone] Christa Mccullough [phone]  1 bisoprolol-hydroCHLOROthiazide    The Plan for Transition of Care is related to the following treatment goals of Pre-syncope [R55]    The Patient and/or patient representative Damian Willingham and her family were provided with a choice of provider and agrees with the discharge plan Yes    Freedom of choice list was provided with basic dialogue that supports the patient's individualized plan of care/goals and shares the quality data associated with the providers.  Yes    Care Transitions patient: No    Paco Mcclure RN  [facility] [employer]  Case Management Department  Ph: [phone]

## 2021-04-01 NOTE — PROGRESS NOTES
Physician Progress Note      PATIENTWoodroe Olszewski  CSN #:                  [id number]  :                       1940  ADMIT DATE:       3/30/2021 11:49 AM  DISCH DATE:  RESPONDING  PROVIDER #:        Barbara Pabon MD          QUERY TEXT:    Patient admitted with vasovagal syncope following liver bx & ABD CT noted to   have hemorrhagic ascites \"more than usually sees\". Pt admitted to inpatient. If possible, please document in discharge summary if you are evaluating and/or   treating any of the following: The medical record reflects the following:  Risk Factors: Liver bx  Clinical Indicators: CT ABD: Mild-to-moderate hemorrhagic ascites, presumably   related to bleeding from biopsy site. HGB: 11.6 (13.5 on )- 10.5- 9.9-   10.1; Per ED \"hemorrhagic ascites from biopsy earlier today, Dr. Av Fernandez   suggested this is slightly more than he usually sees\"  Treatment: Admit to inpatient from OBS status, NS @ 100/hr, Q 6 hr H&H, CT Abd  Options provided:  -- Hemorrhagic ascites due to liver biopsy  -- Hemorrhagic ascites due to liver biopsy not clinically significant  -- Other - I will add my own diagnosis  -- Disagree - Not applicable / Not valid  -- Disagree - Clinically unable to determine / Unknown  -- Refer to Clinical Documentation Reviewer    PROVIDER RESPONSE TEXT:    This patient has Hemorrhagic ascites due to liver biopsy. Query created by:  Shara Mcelroy on 2021 12:45 PM      Electronically signed by:  Barbara Pabon MD 2021 12:48 PM

## 2021-04-02 ENCOUNTER — TELEPHONE (OUTPATIENT)
Dept: INTERNAL MEDICINE CLINIC | Age: 81
End: 2021-04-02

## 2021-04-02 NOTE — TELEPHONE ENCOUNTER
Carlos [facility] Transitions Initial Follow Up Call    Outreach made within 2 business days of discharge: No    Patient: Hezzie Dance Patient : 1940   MRN: <[id number]>  Reason for Admission: There are no discharge diagnoses documented for the most recent discharge. Discharge Date: 21       Spoke with: patient requires English     Discharge department/facility: [facility]    Scheduled appointment with PCP within 7-14 days    Follow Up  No future appointments.     [place], [place]

## 2021-04-09 ENCOUNTER — HOSPITAL ENCOUNTER (OUTPATIENT)
Dept: INTERVENTIONAL RADIOLOGY/VASCULAR | Age: 81
Discharge: HOME | End: 2021-04-09

## 2021-04-12 ENCOUNTER — HOSPITAL ENCOUNTER (INPATIENT)
Age: 81
LOS: 2 days | Discharge: HOME | DRG: 197 | End: 2021-04-14
Attending: INTERNAL MEDICINE | Admitting: INTERNAL MEDICINE
Payer: MEDICAID

## 2021-04-12 PROBLEM — I82.4Y9 DVT, LOWER EXTREMITY, PROXIMAL, ACUTE, UNSPECIFIED LATERALITY (HCC): Status: ACTIVE | Noted: 2021-04-12

## 2021-04-12 LAB
ALBUMIN SERPL-MCNC: 3.2 G/DL (ref 3.4–5)
ANION GAP SERPL CALCULATED.3IONS-SCNC: 16 MMOL/L (ref 3–16)
BASOPHILS ABSOLUTE: 0.1 K/UL (ref 0–0.2)
BASOPHILS RELATIVE PERCENT: 0.9 %
BUN BLDV-MCNC: 21 MG/DL (ref 7–20)
CALCIUM SERPL-MCNC: 9.9 MG/DL (ref 8.3–10.6)
CHLORIDE BLD-SCNC: 97 MMOL/L (ref 99–110)
CO2: 24 MMOL/L (ref 21–32)
CREAT SERPL-MCNC: 1.1 MG/DL (ref 0.6–1.2)
EOSINOPHILS ABSOLUTE: 0.1 K/UL (ref 0–0.6)
EOSINOPHILS RELATIVE PERCENT: 1.1 %
GFR AFRICAN AMERICAN: 58
GFR NON-AFRICAN AMERICAN: 48
GLUCOSE BLD-MCNC: 115 MG/DL (ref 70–99)
HCT VFR BLD CALC: 37.6 % (ref 36–48)
HEMOGLOBIN: 12.2 G/DL (ref 12–16)
LYMPHOCYTES ABSOLUTE: 1.6 K/UL (ref 1–5.1)
LYMPHOCYTES RELATIVE PERCENT: 15.1 %
MAGNESIUM: 1.9 MG/DL (ref 1.8–2.4)
MCH RBC QN AUTO: 29.3 PG (ref 26–34)
MCHC RBC AUTO-ENTMCNC: 32.6 G/DL (ref 31–36)
MCV RBC AUTO: 90 FL (ref 80–100)
MONOCYTES ABSOLUTE: 1 K/UL (ref 0–1.3)
MONOCYTES RELATIVE PERCENT: 10.1 %
NEUTROPHILS ABSOLUTE: 7.5 K/UL (ref 1.7–7.7)
NEUTROPHILS RELATIVE PERCENT: 72.8 %
PDW BLD-RTO: 14.2 % (ref 12.4–15.4)
PHOSPHORUS: 3.6 MG/DL (ref 2.5–4.9)
PLATELET # BLD: 243 K/UL (ref 135–450)
PMV BLD AUTO: 9.1 FL (ref 5–10.5)
POTASSIUM SERPL-SCNC: 4.3 MMOL/L (ref 3.5–5.1)
RBC # BLD: 4.18 M/UL (ref 4–5.2)
SODIUM BLD-SCNC: 137 MMOL/L (ref 136–145)
WBC # BLD: 10.4 K/UL (ref 4–11)

## 2021-04-12 PROCEDURE — 1200000000 HC SEMI PRIVATE

## 2021-04-12 PROCEDURE — 85025 COMPLETE CBC W/AUTO DIFF WBC: CPT

## 2021-04-12 PROCEDURE — 6360000002 HC RX W HCPCS: Performed by: STUDENT IN AN ORGANIZED HEALTH CARE EDUCATION/TRAINING PROGRAM

## 2021-04-12 PROCEDURE — 80069 RENAL FUNCTION PANEL: CPT

## 2021-04-12 PROCEDURE — 6370000000 HC RX 637 (ALT 250 FOR IP): Performed by: STUDENT IN AN ORGANIZED HEALTH CARE EDUCATION/TRAINING PROGRAM

## 2021-04-12 PROCEDURE — 36415 COLL VENOUS BLD VENIPUNCTURE: CPT

## 2021-04-12 PROCEDURE — 83735 ASSAY OF MAGNESIUM: CPT

## 2021-04-12 RX ORDER — ONDANSETRON 2 MG/ML
4 INJECTION INTRAMUSCULAR; INTRAVENOUS EVERY 6 HOURS PRN
Status: DISCONTINUED | OUTPATIENT
Start: 2021-04-12 | End: 2021-04-14 | Stop reason: HOSPADM

## 2021-04-12 RX ORDER — POLYETHYLENE GLYCOL 3350 17 G/17G
17 POWDER, FOR SOLUTION ORAL DAILY PRN
Status: DISCONTINUED | OUTPATIENT
Start: 2021-04-12 | End: 2021-04-14 | Stop reason: HOSPADM

## 2021-04-12 RX ORDER — SODIUM CHLORIDE 0.9 % (FLUSH) 0.9 %
5-40 SYRINGE (ML) INJECTION PRN
Status: DISCONTINUED | OUTPATIENT
Start: 2021-04-12 | End: 2021-04-14 | Stop reason: HOSPADM

## 2021-04-12 RX ORDER — HYDROCHLOROTHIAZIDE 25 MG/1
12.5 TABLET ORAL DAILY
Status: DISCONTINUED | OUTPATIENT
Start: 2021-04-12 | End: 2021-04-14 | Stop reason: HOSPADM

## 2021-04-12 RX ORDER — METOPROLOL SUCCINATE 50 MG/1
50 TABLET, EXTENDED RELEASE ORAL DAILY
Status: DISCONTINUED | OUTPATIENT
Start: 2021-04-12 | End: 2021-04-14 | Stop reason: HOSPADM

## 2021-04-12 RX ORDER — ACETAMINOPHEN 325 MG/1
650 TABLET ORAL EVERY 6 HOURS PRN
Status: DISCONTINUED | OUTPATIENT
Start: 2021-04-12 | End: 2021-04-14 | Stop reason: HOSPADM

## 2021-04-12 RX ORDER — BISOPROLOL FUMARATE AND HYDROCHLOROTHIAZIDE 2.5; 6.25 MG/1; MG/1
1 TABLET ORAL 2 TIMES DAILY
Status: DISCONTINUED | OUTPATIENT
Start: 2021-04-12 | End: 2021-04-12 | Stop reason: SDUPTHER

## 2021-04-12 RX ORDER — FAMOTIDINE 20 MG/1
20 TABLET, FILM COATED ORAL 2 TIMES DAILY
Status: DISCONTINUED | OUTPATIENT
Start: 2021-04-12 | End: 2021-04-14 | Stop reason: HOSPADM

## 2021-04-12 RX ORDER — SODIUM CHLORIDE 0.9 % (FLUSH) 0.9 %
5-40 SYRINGE (ML) INJECTION EVERY 12 HOURS SCHEDULED
Status: DISCONTINUED | OUTPATIENT
Start: 2021-04-12 | End: 2021-04-14 | Stop reason: HOSPADM

## 2021-04-12 RX ORDER — SODIUM CHLORIDE 9 MG/ML
25 INJECTION, SOLUTION INTRAVENOUS PRN
Status: DISCONTINUED | OUTPATIENT
Start: 2021-04-12 | End: 2021-04-14 | Stop reason: HOSPADM

## 2021-04-12 RX ORDER — ACETAMINOPHEN 650 MG/1
650 SUPPOSITORY RECTAL EVERY 6 HOURS PRN
Status: DISCONTINUED | OUTPATIENT
Start: 2021-04-12 | End: 2021-04-14 | Stop reason: HOSPADM

## 2021-04-12 RX ORDER — LATANOPROST 50 UG/ML
1 SOLUTION/ DROPS OPHTHALMIC NIGHTLY
Status: DISCONTINUED | OUTPATIENT
Start: 2021-04-12 | End: 2021-04-14 | Stop reason: HOSPADM

## 2021-04-12 RX ORDER — PROMETHAZINE HYDROCHLORIDE 12.5 MG/1
12.5 TABLET ORAL EVERY 6 HOURS PRN
Status: DISCONTINUED | OUTPATIENT
Start: 2021-04-12 | End: 2021-04-14 | Stop reason: HOSPADM

## 2021-04-12 RX ADMIN — ENOXAPARIN SODIUM 60 MG: 60 INJECTION SUBCUTANEOUS at 21:11

## 2021-04-12 ASSESSMENT — PAIN SCALES - GENERAL: PAINLEVEL_OUTOF10: 0

## 2021-04-12 NOTE — H&P
Internal Medicine PGY-1 Resident History & Physical      PCP: Rocky Guevara MD    Date of Admission: 4/12/2021    Date of Service: Pt seen/examined on 4/12/2021    Chief Complaint: Lower extremity pain    Of Present Illness:      Patient is a 80 y.o. female  with PMHx of breast cancer with mets to liver, and pancreas, HTN who presented to [facility]. with from OSH due to lower extremity pain that she started experiencing while getting acupuncture on Saturday. States that it gets better with rest and worse with walking comes in as an achy feeling. Family called on the acupuncturist and had her go get a lower extremity ultrasound that found a totally occlusive DVT in her left femoral vein, totally occlusive DVT in the right popliteal vein. Initially went to OSH however were not able to get IV access on her at that time so family brought her here. Of note patient was recently here back on 3/30 with presyncope from same day surgery status post liver biopsy. Patient had been having a hypotensive episode and CODE BLUE was called, hypotensive episode was thought to be vasovagal and she responded with fluid resuscitation. She denies chest pain, fever, shortness of breath, nausea, vomiting, abdominal pain.     Past Medical History:          Diagnosis Date    Breast cancer ([facility] [address] 2/14/2012    Diarrhea 7/6/2012    Glaucoma 4/15/2014    H/O: hysterectomy 2/14/2012    History of thyroiditis 08/08/2012    HTN (hypertension) 2/14/2012    Hypothyroidism     IFG (impaired fasting glucose)     Iron deficiency     Liver cyst 8/7/2012    Thalassemia minor     Tonsil stone     Vertigo 5/22/2018    Vitamin D deficiency 4/18/2014       Past Surgical History:          Procedure Laterality Date    BREAST SURGERY      CT NEEDLE BIOPSY LIVER PERCUTANEOUS  3/30/2021    CT NEEDLE BIOPSY LIVER PERCUTANEOUS 3/30/2021 [facility] CT SCAN    HYSTERECTOMY         Medications Prior to Admission:      Prior to Admission medications Medication Sig Start Date End Date Taking? Authorizing Provider   bisoprolol-hydroCHLOROthiazide [facility]) 2.5-6.25 MG per tablet Take 1 tablet by mouth 2 times daily 4/1/21   Stefani Miranda MD   diclofenac (VOLTAREN) 75 MG EC tablet TAKE ONE TABLET BY MOUTH TWICE A DAY WITH MEALS 11/23/20   Nan Porras MD   latanoprost (XALATAN) 0.005 % ophthalmic solution 1 drop nightly    Historical Provider, MD   vitamin D (ERGOCALCIFEROL) [address] UNITS CAPS capsule TAKE ONE CAPSULE BY MOUTH ONCE WEEKLY  Patient taking differently: every 14 days  6/5/17   AUTUMN Ledezma - CNP       Allergies: Latex and Ace inhibitors    Social History:      The patient currently lives at home    TOBACCO: reports that she has never smoked. She has never used smokeless tobacco.  ETOH:   reports no history of alcohol use. DRUG:       Family History:      Reviewed in detail and negative for DM, CAD, Cancer, CVA. Positive as follows:        Problem Relation Age of Onset    Stroke Mother     Heart Disease Father     High Blood Pressure Father        OF SYSTEMS:   Pertinent positives as noted in the HPI. All other systems reviewed and negative. ROS: Review of Systems    PHYSICAL EXAM PERFORMED:    Ht 5' 2\" (1.575 m)   Wt 125 lb 10.6 oz (57 kg)   BMI 22.98 kg/m²     Physical Exam  Constitutional:       Appearance: Normal appearance. She is normal weight. HENT:      Head: Normocephalic and atraumatic. Mouth/Throat:      Mouth: Mucous membranes are moist.      Pharynx: Oropharynx is clear. Cardiovascular:      Rate and Rhythm: Normal rate and regular rhythm. Heart sounds: Normal heart sounds. No murmur. No friction rub. No gallop. Pulmonary:      Breath sounds: No wheezing, rhonchi or rales. Abdominal:      General: Abdomen is flat. Bowel sounds are normal.      Palpations: Abdomen is soft. Tenderness: There is no abdominal tenderness. Musculoskeletal:      Right lower leg: No edema. Left lower leg: No edema. Comments: Positive Homans' sign on left lower extremity   Skin:     General: Skin is warm and dry. Neurological:      General: No focal deficit present. Mental Status: She is alert and oriented to person, place, and time. Psychiatric:         Mood and Affect: Mood normal.         Thought Content: Thought content normal.           Labs:     No results for input(s): WBC, HGB, HCT, PLT in the last 72 hours. No results for input(s): NA, K, CL, CO2, BUN, CREATININE, CALCIUM, PHOS in the last 72 hours. Invalid input(s): MAGNES  No results for input(s): AST, ALT, BILIDIR, BILITOT, ALKPHOS in the last 72 hours. No results for input(s): INR in the last 72 hours. No results for input(s): Rejeana Kemps in the last 72 hours. Urinalysis:   Lab Results   Component Value Date    NITRU Negative 04/12/2019    BACTERIA Few 04/12/2019    RBCUA 2 04/12/2019    BLOODU neg 01/20/2020    SPECGRAV 1.020 01/20/2020    SPECGRAV 1.021 04/12/2019    GLUCOSEU neg 01/20/2020    GLUCOSEU Negative 04/12/2019       Radiology:     No orders to display       ASSESSMENT & PLAN:  Active Hospital Problems    Diagnosis Date Noted    DVT, lower extremity, proximal, acute, unspecified laterality ([facility] [address] [I82.4Y9] 04/12/2021     Left femoral DVT  Patient presents with bilateral lower extremity pain after receiving acupuncture therapy. Lower extremity Doppler shows totally occlusive left femoral DVT, and totally occlusive popliteal DVT on the right.  -Therapeutic Lovenox  -Vascular surgery consult, appreciate recs. HTN  -Continue home [address] [place] [address]. Breast cancer with likely mets to liver, pancreas.  -Currently not on any adjuvant therapy at this time.     DVT Prophylaxis: Therapeutic Lovenox  Diet: Diet NPO Effective Now  Code Status: Full Code      Dispo -GMF    I will discuss the patient with the senior resident and MD Clare Santana DO  Internal Medicine Resident, PGY-1  Reach me via [employer]

## 2021-04-12 NOTE — PROGRESS NOTES
4 Eyes Admission Assessment     I agree as the admission nurse that 2 RN's have performed a thorough Head to Toe Skin Assessment on the patient. ALL assessment sites listed below have been assessed on admission. Areas assessed by both nurses:   [x]   Head, Face, and Ears   [x]   Shoulders, Back, and Chest  [x]   Arms, Elbows, and Hands   [x]   Coccyx, Sacrum, and Ischium  [x]   Legs, Feet, and Heels        Does the Patient have Skin Breakdown?   No         Vinay Prevention initiated:  No   Wound Care Orders initiated:  No      [facility] nurse consulted for Pressure Injury (Stage 3,4, Unstageable, DTI, NWPT, and Complex wounds) or Vinay score 18 or lower:  NA      Nurse 1 eSignature: Electronically signed by Hiwot White RN on 4/12/21 at 6:49 PM EDT    **SHARE this note so that the co-signing nurse is able to place an eSignature**    Nurse 2 eSignature: Electronically signed by Aimee Morley RN on 4/12/21 at 6:56 PM EDT

## 2021-04-13 PROBLEM — C25.0 MALIGNANT NEOPLASM OF HEAD OF PANCREAS (HCC): Status: ACTIVE | Noted: 2021-04-13

## 2021-04-13 PROCEDURE — 1200000000 HC SEMI PRIVATE

## 2021-04-13 PROCEDURE — 99222 1ST HOSP IP/OBS MODERATE 55: CPT | Performed by: INTERNAL MEDICINE

## 2021-04-13 PROCEDURE — 6360000002 HC RX W HCPCS: Performed by: STUDENT IN AN ORGANIZED HEALTH CARE EDUCATION/TRAINING PROGRAM

## 2021-04-13 PROCEDURE — 99254 IP/OBS CNSLTJ NEW/EST MOD 60: CPT | Performed by: SURGERY

## 2021-04-13 PROCEDURE — 99221 1ST HOSP IP/OBS SF/LOW 40: CPT | Performed by: NURSE PRACTITIONER

## 2021-04-13 PROCEDURE — 2580000003 HC RX 258: Performed by: STUDENT IN AN ORGANIZED HEALTH CARE EDUCATION/TRAINING PROGRAM

## 2021-04-13 RX ADMIN — ENOXAPARIN SODIUM 60 MG: 60 INJECTION SUBCUTANEOUS at 21:59

## 2021-04-13 RX ADMIN — Medication 10 ML: at 09:17

## 2021-04-13 RX ADMIN — ENOXAPARIN SODIUM 60 MG: 60 INJECTION SUBCUTANEOUS at 09:15

## 2021-04-13 ASSESSMENT — ENCOUNTER SYMPTOMS
NAUSEA: 0
DIARRHEA: 0
VOMITING: 0
SHORTNESS OF BREATH: 0
ABDOMINAL PAIN: 0
CONSTIPATION: 0

## 2021-04-13 NOTE — PROGRESS NOTES
Internal Medicine PGY-1 Resident Progress Note        PCP: Dieudonne Abraham MD    Date of Admission: 4/12/2021    Chief Complaint: Lower extremity pain    Interval History:   Patient seen at bedside, not in acute distress no acute events overnight. Patient denies any lower extremity pain while in bed, did have some pain in her legs when walking to the bathroom. She denies fever, chills, chest pain, shortness of breath, nausea, vomiting, abdominal pain. Medications:  Reviewed  Medications    sodium chloride       Scheduled Medications    latanoprost  1 drop Both Eyes Nightly    sodium chloride flush  5-40 mL Intravenous 2 times per day    famotidine  20 mg Oral BID    enoxaparin  1 mg/kg Subcutaneous BID    metoprolol succinate  50 mg Oral Daily    hydroCHLOROthiazide  12.5 mg Oral Daily     PRN Meds: sodium chloride flush, sodium chloride, promethazine **OR** ondansetron, polyethylene glycol, acetaminophen **OR** acetaminophen      Intake/Output Summary (Last 24 hours) at 4/13/2021 1100  Last data filed at 4/12/2021 2236  Gross per 24 hour   Intake 0 ml   Output --   Net 0 ml       Physical Exam Performed:    /69   Pulse 91   Temp 98.3 °F (36.8 °C) (Oral)   Resp 16   Ht 5' 2\" (1.575 m)   Wt 126 lb 1.7 oz (57.2 kg)   SpO2 95%   BMI 23.06 kg/m²     Physical Exam  Constitutional:       Appearance: She is normal weight. HENT:      Head: Normocephalic and atraumatic. Mouth/Throat:      Mouth: Mucous membranes are moist.      Pharynx: Oropharynx is clear. Cardiovascular:      Rate and Rhythm: Normal rate and regular rhythm. Heart sounds: Normal heart sounds. No murmur. No friction rub. No gallop. Pulmonary:      Breath sounds: Normal breath sounds. No wheezing, rhonchi or rales. Abdominal:      General: Abdomen is flat. Bowel sounds are normal.      Palpations: Abdomen is soft. Tenderness: There is no abdominal tenderness. Musculoskeletal:      Right lower leg: Edema present. Left lower leg: Edema present. Comments: Positive Homans' sign   Skin:     General: Skin is warm and dry. Neurological:      General: No focal deficit present. Mental Status: She is alert and oriented to person, place, and time. Mental status is at baseline. Psychiatric:         Mood and Affect: Mood normal.         Thought Content: Thought content normal.         Labs:   Recent Labs     04/12/21  1748   WBC 10.4   HGB 12.2   HCT 37.6        Recent Labs     04/12/21  1748      K 4.3   CL 97*   CO2 24   BUN 21*   CREATININE 1.1   CALCIUM 9.9   PHOS 3.6     No results for input(s): AST, ALT, BILIDIR, BILITOT, ALKPHOS in the last 72 hours. No results for input(s): INR in the last 72 hours. No results for input(s): Aliyah Burdock in the last 72 hours. Urinalysis:      Lab Results   Component Value Date    NITRU Negative 04/12/2019    BACTERIA Few 04/12/2019    RBCUA 2 04/12/2019    BLOODU neg 01/20/2020    SPECGRAV 1.020 01/20/2020    SPECGRAV 1.021 04/12/2019    GLUCOSEU neg 01/20/2020    GLUCOSEU Negative 04/12/2019       Radiology:  No orders to display         Assessment/Plan:    Active Hospital Problems    Diagnosis Date Noted    Malignant neoplasm of head of pancreas ([facility] [address] [C25.0] 04/13/2021    DVT, lower extremity, proximal, acute, unspecified laterality ([facility] [address] [I82.4Y9] 04/12/2021     Left femoral DVT  Patient presents with bilateral lower extremity pain after receiving acupuncture therapy.  Lower extremity Doppler shows totally occlusive left femoral DVT, and totally occlusive popliteal DVT on the right.  -Therapeutic Lovenox  -Vascular surgery consult, recommend continuing current medical therapy.     HTN  -Continue home Ziac.     Pancreatic adenocarcinoma  -Currently not on any adjuvant therapy at this time.  -Oncology consulted     DVT Prophylaxis: Therapeutic Lovenox  Diet: Diet NPO Effective Now  Code Status: Full Code      Dispo -GMF     I will discuss the patient with the senior resident and MD Tia Walls, DO  Internal Medicine Resident, PGY-1  Reach me via [employer]

## 2021-04-13 NOTE — PROGRESS NOTES
Uneventful shift. Patient remains a/o 4, but is [place] speaking only.  tablet used to communicate. Call light compliant  VSS. Walks with gait belt and 1 assist.Bed locked and in lowest position. Alarm intact. CLWR. Will continue to monitor.

## 2021-04-13 NOTE — CARE COORDINATION
Case Management Assessment           Initial Evaluation                Date / Time of Evaluation: 4/13/2021 2:30 PM                 Assessment Completed by: Benjamin Burgess Day    Patient Name: Kendra Foss     YOB: 1940  Diagnosis: DVT, lower extremity, proximal, acute, unspecified laterality ([address] [I82.4Y9]     Date / Time: 4/12/2021  4:25 PM    Patient Admission Status: Inpatient    If patient is discharged prior to next notation, then this note serves as note for discharge by case management.      Current PCP: Violette Jara MD  Clinic Patient: No    Chart Reviewed: Yes  Patient/ Family Interviewed: Yes    Initial assessment completed at bedside with: Patient and daughter     Hospitalization in the last 30 days: Yes    Emergency Contacts:  Extended Emergency Contact Information  Primary Emergency Contact: [address] [place] [address] Phone: [phone]  Mobile Phone: [phone]  Relation: Child  Secondary Emergency Contact: [facility] [address] Phone: [phone]  Relation: Child    Advance Directives:   Code Status: Full [address]: No    Financial:  Payor: MEDICAID [place] / Plan: Terrence Parker DEPT OF JOB / Product Type: *No Product type* /     Pre-cert required for SNF: Yes    Pharmacy:    [facility] [address] , [address] [address] [phone] [facility] [phone]  [address] [address]  Phone: [phone] Fax: [phone]    [address] [facility]  [address] [phone] [facility] [phone]  [address]  [address] [facility],[address] [address]  Phone: [phone] Fax: [address] [place] - F [phone]  [facility] [address] [place] [address]  Phone: [phone] Fax: [phone]      Potential assistance Purchasing Medications: Potential Assistance Purchasing Medications: No  Does Patient want to participate in local refill/ meds to beds program?: No    Meds To Beds General Rules:  1. Can ONLY be done Monday- Friday between 8:30am-5pm  2. Prescription(s) must be in pharmacy by 3pm to be filled same day  3. Copy of patient's insurance/ prescription drug card and patient face sheet must be sent along with the prescription(s)  4. Cost of Rx cannot be added to hospital bill. If financial assistance is needed, please contact unit  or ;  or  CANNOT provide pharmacy voucher for patients co-pays  5. Patients can then  the prescription on their way out of the hospital at discharge, or pharmacy can deliver to the bedside if staff is available. (payment due at time of pick-up or delivery - cash, check, or card accepted)     Able to afford home medications/ co-pay costs: Yes    ADLS:  Support Systems: Children, Family Members    PT AM-PAC:   /24  OT AM-PAC:   /24    Housing:  Home Environment: From home with daughter. Steps: yes     Plans to RETURN to current housing: Yes  Barrier(s) to RETURNING to current housing: Hospice Set up.      Home Care Information:  Currently ACTIVE with Home Health Care: Yes  Home Care Agency: [facility] (a[facility] services)    Currently ACTIVE with [place] on Aging: Yes  Passport/ Waiver: Yes  Passport/ Waiver Services: [address] for 4 hours/ day for 5 days per week    : could not disclose      Durable Medical Equipment:  DME Provider: none   Equipment: none reported     Home Oxygen and Respiratory Equipment:  Has HOME OXYGEN prior to admission: No    Dialysis:  Active with HD/PD prior to admission: No    DISCHARGE PLAN:  Disposition: Home with Hospice (referral made to [address] [place] [address])     Transportation PLAN for discharge: family     Factors facilitating achievement of predicted outcomes: Family support, Motivated, Cooperative and Pleasant    Barriers to discharge: Hospice referral placed     Additional Case Management Notes:   SW met with patient and daughter at bedside. Daughter remembered SW from previous admission. Patient still active with Passport services. Family meet with palliative care. Patient and daughter have requested a referral to [address]. SW completed referral at 2:30 pm to [address]. SW to follow and assist with further discharge and care needs. UPDATE: 3:28 pm. Family did not want Hospice and SW made referral to [address] [place] [address] through [facility] and [place] [address] for services. Faxed referral.     The Plan for Transition of Care is related to the following treatment goals DVT, lower extremity, proximal, acute, unspecified laterality ([facility] [address] [I82.4Y9]      The Patient and/or patient representative Patient  was provided with a choice of provider and agrees with the discharge plan Yes    Freedom of choice list was provided with basic dialogue that supports the patient's individualized plan of care/goals and shares the quality data associated with the providers.  Yes    Care Transition patient: No    BINA Mauro  [facility]   Case Management Department  Ph: [phone]

## 2021-04-13 NOTE — PROGRESS NOTES
[facility]:  Met with pt. And her daughter to review Hospice services and [facility] services. They would like to have Palliative Care services upon discharge and transition to hospice in the future. Daughter asking for assistance with administering Lovenox injections at home if pt. Goes home with these as she states \"I cannot give\". Updated Edis Green in [address] on all of above. Please call if needed; can be available if plan changes.      Thank you,  Oracio Olmos

## 2021-04-13 NOTE — PROGRESS NOTES
Patient alert and oriented x4. Patient only speaks Vincentian, interpretor tablet in room and has been used. Up x1 CGA. No c/o pain. Fall precautions in place, call light within reach, bed alarm on, bed in lowest position, and non skid socks on. VSS. Denies needs at this time.

## 2021-04-13 NOTE — CONSULTS
capsule TAKE ONE CAPSULE BY MOUTH ONCE WEEKLY (Patient taking differently: every 14 days ) 4 capsule 2     Current Meds  latanoprost (XALATAN) 0.005 % ophthalmic solution 1 drop, Nightly  sodium chloride flush 0.9 % injection 5-40 mL, 2 times per day  sodium chloride flush 0.9 % injection 5-40 mL, PRN  0.9 % sodium chloride infusion, PRN  promethazine (PHENERGAN) tablet 12.5 mg, Q6H PRN    Or  ondansetron (ZOFRAN) injection 4 mg, Q6H PRN  polyethylene glycol (GLYCOLAX) packet 17 g, Daily PRN  acetaminophen (TYLENOL) tablet 650 mg, Q6H PRN    Or  acetaminophen (TYLENOL) suppository 650 mg, Q6H PRN  famotidine (PEPCID) tablet 20 mg, BID  enoxaparin (LOVENOX) injection 60 mg, BID  metoprolol succinate (TOPROL XL) extended release tablet 50 mg, Daily  hydroCHLOROthiazide (HYDRODIURIL) tablet 12.5 mg, Daily        Family History:   Family History   Problem Relation Age of Onset    Stroke Mother     Heart Disease Father     High Blood Pressure Father        Social History:   TOBACCO:   reports that she has never smoked. She has never used smokeless tobacco.  ETOH:   reports no history of alcohol use. DRUGS:   reports previous drug use. ROS: Review of Systems:   A 14 point review of systems was conducted, significant findings as noted in HPI. All other systems negative. Physical exam:    Vitals:    04/13/21 0651   BP: 138/69   Pulse: 91   Resp: 16   Temp: 98.3 °F (36.8 °C)   SpO2: 95%       General appearance: alert, resting in bed  Neuro: A&Ox3  Neck: trachea midline, no JVD, no carotid bruits  Lungs: CTAB  Heart: RRR  Abdomen: soft, non-tender, non-distended, +BS, no palpable abdominal aneurysm, no renal bruits  Extremities: mild swelling of the left lower extremity with a positive Shannan's sign.      Labs:    CBC:   Recent Labs     04/12/21  1748   WBC 10.4   HGB 12.2   HCT 37.6   MCV 90.0        BMP:   Recent Labs     04/12/21  1748      K 4.3   CL 97*   CO2 24   PHOS 3.6   BUN 21*   CREATININE 1.1     PT/INR: No results for input(s): PROTIME, INR in the last 72 hours. APTT: No results for input(s): APTT in the last 72 hours. Liver Profile:  Lab Results   Component Value Date    AST 63 03/30/2021    ALT 83 03/30/2021    BILITOT 1.1 03/30/2021    ALKPHOS 324 03/30/2021     Lab Results   Component Value Date    CHOL 243 12/06/2019    HDL 50 12/06/2019    TRIG 220 12/06/2019     UA:   Lab Results   Component Value Date    NITRITE neg 01/20/2020    COLORU yellow 01/20/2020    COLORU Yellow 04/12/2019    PHUR 6.0 01/20/2020    PHUR 5.0 04/16/2014    RBCUA 2 04/12/2019    MUCUS Present 04/12/2019    BACTERIA Few 04/12/2019    CLARITYU clear 01/20/2020    CLARITYU Slightly-Cloudy 04/12/2019    SPECGRAV 1.020 01/20/2020    SPECGRAV 1.021 04/12/2019    LEUKOCYTESUR neg 01/20/2020    LEUKOCYTESUR Trace 04/12/2019    UROBILINOGEN <2.0 04/12/2019    BILIRUBINUR neg 01/20/2020    BLOODU neg 01/20/2020    GLUCOSEU neg 01/20/2020    GLUCOSEU Negative 04/12/2019       Imaging  No orders to display             Assessment/Plan:  Ms. Malorie Beverly is an 80year old female with a history of breast cancer and pancreatic cancer with metastasis to the liver who presented to the [facility]. with lower extremity pain. Ultrasound (report above) of the lower extremity showed complete occlusion of the left femoral vein and right popliteal vein. - Vascular surgery recommends ongoing therapeutic anticoagulation in setting of hypercoagulable state secondary to cancer.  - Recommend ordering compression stockings with 20-30 mmHg of pressure.    - No vascular surgery recommended at this time. - Call with questions.      Connor Means MD  General Surgery, PGY1  04/13/21  9:10 AM  [phone]

## 2021-04-13 NOTE — PLAN OF CARE
Problem: Falls - Risk of:  Goal: Will remain free from falls  Description: Will remain free from falls  Outcome: Ongoing  Note: Patient has remained free from falls. Fall precautions in place, call light within reach, bed alarm on, bed in lowest position, and non skid socks on. Problem: Falls - Risk of:  Goal: Absence of physical injury  Description: Absence of physical injury  Outcome: Ongoing  Note: Patient has remained free from physical injury. Fall precautions are in place and patients needs are being met.

## 2021-04-13 NOTE — PLAN OF CARE
Problem: Falls - Risk of:  Goal: Will remain free from falls  Description: Will remain free from falls  Outcome: Ongoing   All fall precautions in place. Bed locked and in lowest position with alarm on. Over-bed table and personal belongings within reach. Patient instructed to use call light for assistance and calls for help appropriately. Non-skids socks on. Will continue to monitor.

## 2021-04-13 NOTE — CONSULTS
less support compared to hospice. We returned to the discussion of what services hospice provides, and we brought up the idea that it may be a good idea to speak directly with the hospice nurse to find out what services they offer; the daughter agreed that it may be better for her to ask them questions directly. 1. Goals of Care/Advanced Care planning/Code status: Patient and daughter are open to meeting with the hospice nurse, but currently wish to remain Full Code. Case management notified for referral to [address] [place] [address]. 2. Pain: Lower extremity pain, particularly with ambulation  3. SOB: Patient denies  4. Bilateral occlusive DVTs (Lt Fem, Rt pop): Patient currently receiving therapeutic lovenox; vascular surgery was consulted, no vascular surgery was recommended at this time. 5. Metastatic pancreatic adenocarcinoma: Patient refusing treatment at this time; discussed with pt and daughter today as above  6. Disposition: Pending medical clearance    Reason for Consult:         [x]  Goals of Care  [x]  Code Status Discussion/Advanced Care Planning   [x]  Psychosocial/Family Support  []  Symptom Management  []  Other (Specify)    Requesting Physician: Dr. Do Rebolledo:  Bilateral DVTs    History Obtained From:  patient, electronic medical record    History of Present Illness:         Selwyn Crowley is a 80 y.o. female with PMH of Breast Cancer s/p mastectomy 2005 (refused any adjuvant therapy) and pancreatic adenocarcinoma with metastases to the liver who presented with lower extremity pain while getting acupuncture. She underwent LE ultrasound which showed totally occlusive DVTs in the left femoral vein and right popliteal vein.  Initially she went to an OSH but they were unable to get IV access, and so her family contacted her PCP and [facility]    Patient notably was recently admitted for a hypotensive, pre-syncopal episode following her liver biopsy which diagnosed the liver mets. She responded to fluid resuscitation and it was thought to be most likely vasovagal. She followed up with Heme/Onc both at [facility] and at [address] [place] [address] outpatient, 4/5 and 4/6 respectively. It was discussed that the patient's disease is unfortunately a life-threatening process. Patient and family did not want to undergo chemotherapy or radiation and patient is not a good candidate for surgical intervention. The idea of palliative care and/or hospice was brought up at both appointments, but it is unclear the patient and her family's feelings on the matter from those encounters. Subjective:         Past Medical History:        Diagnosis Date    Breast cancer ([facility] [address] 2/14/2012    Diarrhea 7/6/2012    Glaucoma 4/15/2014    H/O: hysterectomy 2/14/2012    History of thyroiditis 08/08/2012    HTN (hypertension) 2/14/2012    Hypothyroidism     IFG (impaired fasting glucose)     Iron deficiency     Liver cyst 8/7/2012    Malignant neoplasm of head of pancreas ([facility] [address] 4/13/2021    Thalassemia minor     Tonsil stone     Vertigo 5/22/2018    Vitamin D deficiency 4/18/2014       Past Surgical History:        Procedure Laterality Date    BREAST SURGERY      CT NEEDLE BIOPSY LIVER PERCUTANEOUS  3/30/2021    CT NEEDLE BIOPSY LIVER PERCUTANEOUS 3/30/2021 TJ CT SCAN    HYSTERECTOMY         Current Medications:    Medications Prior to Admission: bisoprolol-hydroCHLOROthiazide (ZIAC) 2.5-6.25 MG per tablet, Take 1 tablet by mouth 2 times daily  diclofenac (VOLTAREN) 75 MG EC tablet, TAKE ONE TABLET BY MOUTH TWICE A DAY WITH MEALS  latanoprost (XALATAN) 0.005 % ophthalmic solution, 1 drop nightly  vitamin D (ERGOCALCIFEROL) [address] UNITS CAPS capsule, TAKE ONE CAPSULE BY MOUTH ONCE WEEKLY (Patient taking differently: every 14 days )    Allergies:  Latex and Ace inhibitors    Social History:    · TOBACCO: reports that she has never smoked.  She has never used smokeless tobacco.  · ETOH:   reports no history of alcohol use. · Patient currently lives with family    Review of Systems -   Review of Systems   Constitutional: Negative for chills and fever. Respiratory: Negative for shortness of breath. Cardiovascular: Negative for chest pain and palpitations. Gastrointestinal: Negative for abdominal pain, constipation, diarrhea, nausea and vomiting. Musculoskeletal:        Positive for: Leg Pain   : A 10 point review of systems was conducted, significant findings as noted in HPI. Objective:          Physical Exam  Constitutional:       Appearance: Normal appearance. HENT:      Head: Normocephalic and atraumatic. Cardiovascular:      Rate and Rhythm: Normal rate and regular rhythm. Pulses: Normal pulses. Heart sounds: Normal heart sounds. Pulmonary:      Effort: Pulmonary effort is normal.      Breath sounds: Normal breath sounds. Musculoskeletal:         General: Tenderness present. Right lower leg: Edema present. Left lower leg: Edema present. Neurological:      General: No focal deficit present. Mental Status: She is alert and oriented to person, place, and time. Palliative Performance Scale:  [] 60% Ambulation reduced; Significant disease; Can't do hobbies/housework; intake normal or reduced; occasional assist; LOC full/confusion  [x] 50% Mainly sit/lie; Extensive disease; Can't do any work; Considerable assist; intake normal  Or reduced; LOC full/confusion   [] 40% Mainly in bed; Extensive disease; Mainly assist; intake normal or reduced; occasional assist; LOC full/confusion  [] 30% Bed Bound; Extensive disease; Total care; intake reduced; LOC full/confusion  [] 20% Bed Bound; Extensive disease; Total care; intake minimal; Drowsy/coma  [] 10% Bed Bound; Extensive disease;  Total care; Mouth care only; Drowsy/coma  [] 0% Death    PPS: 50    Vitals:    /69   Pulse 91   Temp 98.3 °F (36.8 °C) (Oral)   Resp 16   Ht 5' 2\" (1.575 m)   Wt 126 lb 1.7 oz (57.2 kg)

## 2021-04-14 VITALS
SYSTOLIC BLOOD PRESSURE: 127 MMHG | OXYGEN SATURATION: 94 % | DIASTOLIC BLOOD PRESSURE: 67 MMHG | HEART RATE: 80 BPM | WEIGHT: 126.1 LBS | TEMPERATURE: 98.2 F | HEIGHT: 62 IN | BODY MASS INDEX: 23.21 KG/M2 | RESPIRATION RATE: 16 BRPM

## 2021-04-14 LAB
BASOPHILS ABSOLUTE: 0.1 K/UL (ref 0–0.2)
BASOPHILS RELATIVE PERCENT: 0.6 %
EOSINOPHILS ABSOLUTE: 0.2 K/UL (ref 0–0.6)
EOSINOPHILS RELATIVE PERCENT: 2 %
HCT VFR BLD CALC: 37.1 % (ref 36–48)
HEMOGLOBIN: 12.6 G/DL (ref 12–16)
LYMPHOCYTES ABSOLUTE: 1.3 K/UL (ref 1–5.1)
LYMPHOCYTES RELATIVE PERCENT: 14.1 %
MCH RBC QN AUTO: 30.3 PG (ref 26–34)
MCHC RBC AUTO-ENTMCNC: 33.9 G/DL (ref 31–36)
MCV RBC AUTO: 89.3 FL (ref 80–100)
MONOCYTES ABSOLUTE: 1 K/UL (ref 0–1.3)
MONOCYTES RELATIVE PERCENT: 10.7 %
NEUTROPHILS ABSOLUTE: 6.7 K/UL (ref 1.7–7.7)
NEUTROPHILS RELATIVE PERCENT: 72.6 %
PDW BLD-RTO: 14.3 % (ref 12.4–15.4)
PLATELET # BLD: 339 K/UL (ref 135–450)
PMV BLD AUTO: 9.4 FL (ref 5–10.5)
RBC # BLD: 4.16 M/UL (ref 4–5.2)
WBC # BLD: 9.2 K/UL (ref 4–11)

## 2021-04-14 PROCEDURE — 97530 THERAPEUTIC ACTIVITIES: CPT

## 2021-04-14 PROCEDURE — 97162 PT EVAL MOD COMPLEX 30 MIN: CPT

## 2021-04-14 PROCEDURE — 6370000000 HC RX 637 (ALT 250 FOR IP): Performed by: SURGERY

## 2021-04-14 PROCEDURE — 36415 COLL VENOUS BLD VENIPUNCTURE: CPT

## 2021-04-14 PROCEDURE — 97165 OT EVAL LOW COMPLEX 30 MIN: CPT

## 2021-04-14 PROCEDURE — 99238 HOSP IP/OBS DSCHRG MGMT 30/<: CPT | Performed by: INTERNAL MEDICINE

## 2021-04-14 PROCEDURE — 85025 COMPLETE CBC W/AUTO DIFF WBC: CPT

## 2021-04-14 PROCEDURE — 97116 GAIT TRAINING THERAPY: CPT

## 2021-04-14 RX ADMIN — APIXABAN 10 MG: 5 TABLET, FILM COATED ORAL at 11:47

## 2021-04-14 ASSESSMENT — PAIN SCALES - GENERAL
PAINLEVEL_OUTOF10: 0
PAINLEVEL_OUTOF10: 0

## 2021-04-14 NOTE — PROGRESS NOTES
Physical Therapy    Facility/Department: [facility]  Initial Assessment and DISCHARGE    NAME: Selwyn Crowley  : 1940  MRN: [id number]    Date of Service: 2021    Discharge Recommendations:  Selwyn Crowley scored a 22/24 on the AM-PAC short mobility form. At this time, no further PT is recommended upon discharge. Recommend patient returns to prior setting with prior services. PT Equipment Recommendations  Equipment Needed: No    Assessment   Assessment: Pt from home with DVT. No acute PT needs identified as pt demonstrates independence wtih transfers and steady gait. Pt plans to return home with assist from daughter. Will sign off. Decision Making: Medium Complexity  Patient Education: Role of PT. Pt nods with understanding. Daughter verbalized understanding. REQUIRES PT FOLLOW UP: No         Restrictions  Up with assistance     Vision/Hearing  Vision: Within Functional Limits(wears glasses)  Hearing: Within functional limits       Subjective  Chart Reviewed: Yes  Additional Pertinent Hx: Pt admitted from MD office with LE pain and (+) DVT. PMH:  breast CA, HTN, new diagnosis pancreatic CA, glaucoma, vertigo  Diagnosis: DVT    Subjective  Subjective: Pt found supine in bed with daughter at bedside. Pt is [place] speaking, but understands some [place]. Pt pleasant and agreeable for PT.   Pain Screening  Patient Currently in Pain: Denies    Orientation  Within Functional Limits    Social/Functional History  Type of Home: House  Home Layout: Two level, Able to Live on Main level with bedroom/bathroom  Home Access: Stairs to enter with rails  Entrance Stairs - Number of Steps: 2  Bathroom Shower/Tub: Tub/Shower unit  Bathroom Toilet: Standard  Bathroom Equipment: Shower chair, Hand-held shower  Home Equipment: Rolling walker  Receives Help From: Family  ADL Assistance: Needs assistance  Homemaking Assistance: Needs assistance  Homemaking Responsibilities: No  Ambulation Assistance: Independent  Transfer Assistance: Independent  Active : No  Additional Comments: Pt's daughter reports she is pts paid caregiver and assists with bathing/ dressing/ all - IADLs and transportation      Objective  Strength  Strength RLE: WFL  Strength LLE: WFL    Bed mobility  Supine to Sit: Modified independent     Transfers  Sit to Stand: Modified independent  Stand to sit: Modified independent     Ambulation  Device: Hand-Held Assist  Assistance: Supervision  Quality of Gait: Steady gait  Distance: 100ft    Stairs  # Steps : 2  Rails: Right ascending  Assistance: Stand by assistance    Balance  Sitting - Static: Good  Sitting - Dynamic: Good  Standing - Static: Good  Standing - Dynamic: Good    Treatment included gait and transfer training with patient education     Plan: Discharge acute PT      Safety Devices  Type of devices: Left in chair, Chair alarm in place, Call light within reach, Nurse notified(daughter in room)      AM-PAC Score  AM-PAC Inpatient Mobility Raw Score : 22 (04/14/21 1132)  AM-PAC Inpatient T-Scale Score : 53.28 (04/14/21 1132)  Mobility Inpatient CMS 0-100% Score: 20.91 (04/14/21 1132)  Mobility Inpatient CMS G-Code Modifier : CJ (04/14/21 1132)      Therapy Time   Individual Concurrent Group Co-treatment   Time In 1108         Time Out 1132         Minutes 24         Timed Code Treatment Minutes:  12  Total Treatment Minutes:  [facility],

## 2021-04-14 NOTE — PLAN OF CARE
Problem: Falls - Risk of:  Goal: Will remain free from falls  Description: Will remain free from falls  Outcome: Met This Shift   Pt free from injury this shift and free of falls. 2/4 rails up on bed and bed is in the lowest position. Wheels locked and bed alarm set. Socks on pt and ID bands on pt. Call light in reach of pt and pt educated to call out to get up. Will continue to monitor for safety.

## 2021-04-14 NOTE — PROGRESS NOTES
resection, then experienced genital swelling, was told to stop it. Since moving to the [address] [place] [address] and establishing with Dr. Mia Smith, the patient has refused further adjuvant therapy or mammograms, only accepting ultrasounds. Daughter became tearful stating that she had hoped for more from [facility]" that she felt that the everything had moved too slowly and that this could have been caught earlier. Offered support and comfort. Patient's nurse arrived with patient's eliquis (which had replaced the lovenox that morning as Dr. Cinda Mcbride had explained to the daughter earlier). The daughter took objection to this as she thought the patient would be getting a shot, otherwise she wouldn't need to be in the hospital. Explained that the pill would be less painful for the patient as long as she got swallow them and that the shots were not the reason that she was still in the hospital. Also pointed out that the daughter herself had said that she did not feel comfortable giving the shots to her mother. Informed them that we needed to start her on the eliquis before she left and that if she felt the pills were too big she would be okay to cut them in half. After hearing this the patient accepted it, but the daughter continued to insist that the patient receive the injection and wished to speak with Dr. Cinda Mcbride. The private team was informed. The following are the currently established goals/code status, and symptom management. Goals of care: Family wish to continue with some treatment.      Code status: Full Code    Discharge plan: Likely discharge home with outpatient palliative later today    Alo Alfaro MD  PGY-1  04/14/21  1:43 PM

## 2021-04-14 NOTE — PLAN OF CARE
Problem: Skin Integrity:  Goal: Will show no infection signs and symptoms  Description: Will show no infection signs and symptoms  Outcome: Completed  Goal: Absence of new skin breakdown  Description: Absence of new skin breakdown  Outcome: Completed     Problem: Falls - Risk of:  Goal: Will remain free from falls  Description: Will remain free from falls  4/14/2021 1401 by Iman Reynoso RN  Outcome: Completed  4/14/2021 0753 by Orin Fuentes RN  Outcome: Met This Shift  Goal: Absence of physical injury  Description: Absence of physical injury  Outcome: Completed

## 2021-04-14 NOTE — PROGRESS NOTES
Patient being discharged home. All belongings sent with patient and daughter. AVS extensively explained with . Patient and daughter both verbalized understanding. Daughter requested Dr. Deepa Bradshaw to come to bedside. Dr. Deepa Bradshaw explained medication extensively and patient and daughter verbalized understanding. PIV removed per policy. Will continue to monitor until patient is off unit.

## 2021-04-14 NOTE — PROGRESS NOTES
Occupational Therapy   Occupational Therapy Initial Assessment, Treatment and D/c Note   Date: 2021   Patient Name: Sylvie Kraft  MRN: [id number]     : 1940    Date of Service: 2021    Discharge Recommendations:Jay Rosales scored a 20/24 on the -[facility] ADL Inpatient form. At this time, no further OT is recommended upon discharge. Recommend patient returns to prior setting with prior services.    ;om     OT Equipment Recommendations  Equipment Needed: No    Assessment   Assessment: Pt from home with daughter who provides care thru passport services. Pt demo functioning at baseline level for functional mobility / ADLs. No Acute OT needs. No DME needs. Will sign off from OT services. Pt plans for home with prior assist at d/c. Decision Making: Low Complexity  OT Education: OT Role;Plan of Care;IADL Safety  Patient Education: Pt's daughter and caregivers verb understanding  No Skilled OT: At baseline function; Safe to return home; No OT goals identified  REQUIRES OT FOLLOW UP: No  Activity Tolerance  Activity Tolerance: Patient Tolerated treatment well  Activity Tolerance: No MALIK noted. No dizziness  Safety Devices  Safety Devices in place: Yes  Type of devices: Call light within reach; Left in chair;Nurse notified; Chair alarm in place           Patient Diagnosis(es): There were no encounter diagnoses. has a past medical history of Breast cancer ([address] Diarrhea, Glaucoma, H/O: hysterectomy, History of thyroiditis, HTN (hypertension), Hypothyroidism, IFG (impaired fasting glucose), Iron deficiency, Liver cyst, Malignant neoplasm of head of pancreas ([address] Thalassemia minor, Tonsil stone, Vertigo, and Vitamin D deficiency. has a past surgical history that includes Hysterectomy; Breast surgery; and CT NEEDLE BIOPSY LIVER PERCUTANEOUS (3/30/2021). Restrictions  Position Activity Restriction  Other position/activity restrictions:  Up with assistance    Subjective   General  Chart Reviewed: Yes  Additional Pertinent Hx: Admit 4/12 with mutliple DVTs                                         PMHX: HTN, mets to liver, and pancreas new dx 3/31  Family / Caregiver Present: Yes(daughter - paid caregiver for pt thru passport)  Diagnosis: Multiple DVT's  Subjective  Subjective: \" Okay \" \" Nah\" Pt speaks limited english -- able to communicate via pt's daughter / visual cues  Vital Signs  Level of Consciousness: Alert (0)    Social/Functional History  Social/Functional History  Type of Home: House  Home Layout: Two level, Able to Live on Main level with bedroom/bathroom  Home Access: Stairs to enter with rails  Entrance Stairs - Number of Steps: 2  Bathroom Shower/Tub: Tub/Shower unit  Bathroom Toilet: Standard  Bathroom Equipment: Shower chair, Hand-held shower  Home Equipment: Rolling walker  Receives Help From: Family  ADL Assistance: Needs assistance  Homemaking Assistance: Needs assistance  Homemaking Responsibilities: No  Ambulation Assistance: Independent  Transfer Assistance: Independent  Active : No  Additional Comments: Pt's daughter reports she is pts paid caregiver and assists with bathing/ dressing/ all - IADLs and transportation       Objective  Treatment included functional transfer training, ADL's and pt. education. Orientation  Overall Orientation Status: Within Functional Limits(to situation)     Balance  Sitting Balance: Independent  Standing Balance: Independent  Standing Balance  Time: 4 mins x 2 and 2 mins x 2  Activity: functional transfers /stance as sink/ functional mobility in room / hallway / bathroom  Functional Mobility  Functional - Mobility Device: Other(hand held in hawley - no device in room)  Activity: To/from bathroom; Other  Assist Level: Supervision  Toilet Transfers  Toilet - Technique: Ambulating  Equipment Used: Standard toilet  Toilet Transfer: Modified independent; Independent  Toilet Transfers Comments: simulated  ADL  Feeding: Independent;Setup  Grooming: Independent;Setup  LE Dressing: Other (Comment)(Pt's daughter reports she assists 24/7)  Toileting: Unable to assess(comment)(declines to perform -per RN staff Pt performs with setup / supervision)  Tone RUE  RUE Tone: Normotonic  Tone LUE  LUE Tone: Normotonic  Coordination  Movements Are Fluid And Coordinated: Yes     Bed mobility  Supine to Sit: Modified independent  Transfers  Sit to stand: Independent; Modified independent  Stand to sit: Independent; Modified independent  Vision - Basic Assessment  Prior Vision: Wears glasses all the time  Cognition  Overall Cognitive Status: WFL  Cognition Comment: not formally assessed - pts daughter reports pt is at baseline level    LUE AROM (degrees)  LUE AROM : WFL  Left Hand AROM (degrees)  Left Hand AROM: WFL  RUE AROM (degrees)  RUE AROM : WFL  Right Hand AROM (degrees)  Right Hand AROM: WFL    Hand Dominance  Hand Dominance: Right        Plan D/c Acute OT services     AM-PAC Score  AM-PAC Inpatient Daily Activity Raw Score: 20 (04/14/21 1142)  AM-PAC Inpatient ADL T-Scale Score : 42.03 (04/14/21 1142)  ADL Inpatient CMS 0-100% Score: 38.32 (04/14/21 1142)  ADL Inpatient CMS G-Code Modifier : CJ (04/14/21 1142)    Therapy Time   Individual Concurrent Group Co-treatment   Time In 1108         Time Out 1133         Minutes 25              Timed Code Treatment Minutes:   10 mins     Total Treatment Minutes:  25 mins       Karlee Young, OT

## 2021-04-14 NOTE — CARE COORDINATION
Case Management            Discharge Note                    Date / Time of Note: 4/14/2021 2:29 PM                  Discharge Note Completed by: Annabel Sandra    Patient Name: Florence Valadez   YOB: 1940  Diagnosis: DVT, lower extremity, proximal, acute, unspecified laterality [facility]) [I82.4Y9]   Date / Time: 4/12/2021  4:25 PM    Current PCP: Roberto Griffin MD  Clinic patient: No    Hospitalization in the last 30 days: Yes    Advance Directives:  Code Status: Full Code  [place] DNR form completed and on chart: Not Indicated    Financial:  Payor: Keagan Jamil / Plan: 40 [facility] DEPT OF JOB / Product Type: *No Product type* /      Pharmacy:    [facility] [address] [facility] [address] [place] [address] [phone] Starlene Mccullough [phone]  [address] [facility] [address]  Phone: [phone] Fax: [phone]    [address] [facility] [address] [phone] - F [phone]  [address] [facility] [address]  [address] [facility],[address] [address]  Phone: [phone] Fax: [address] [facility] [address] Starlene Mccullough [phone] [address] [facility] [address] [facility] [address] [address]  Phone: [phone] Fax: [phone]      Assistance purchasing medications?: Potential Assistance Purchasing Medications: No  Assistance provided by Case Management: None at this time    Does patient want to participate in local refill/ meds to beds program?: No    Meds To Beds General Rules:  1. Can ONLY be done Monday- Friday between 8:30am-5pm  2. Prescription(s) must be in pharmacy by 3pm to be filled same day  3. Copy of patient's insurance/ prescription drug card and patient face sheet must be sent along with the prescription(s)  4. Cost of Rx cannot be added to hospital bill.  If financial assistance is needed, please contact unit case manager or ;  or  CANNOT provide pharmacy voucher for patients co-pays  5. Patients can then  the prescription on their way out of the hospital at discharge, or pharmacy can deliver to the bedside if staff is available. (payment due at time of pick-up or delivery - cash, check, or card accepted)     Able to afford home medications/ co-pay costs: Yes    ADLS:  Current PT AM-PAC Score: 22 /24  Current OT AM-PAC Score: 20 /24      Discharge Disposition: Home- [address] resume Passport services in home. LOC at discharge: Not Applicable  WILSON Completed: Not Indicated    Notification completed in HENS/PAS?:  No    IMM Completed:   No    Transportation:  Transportation Plan for discharge: daughter   Mode of Transport: [address]:  [address] ordered at discharge: Not Indicated    Durable Medical Equipment:  DME Provider: None   Equipment obtained during hospitalization: No DME needs     Home Oxygen and Respiratory Equipment:  Oxygen needed at discharge?: Not Indicated    Dialysis:  Dialysis patient: No    Referrals made at [facility] for outpatient continued care:  Not Applicable    Additional CM Notes:   Patient met with daughter and patient at bedside. Medical team worked out oral medication regimen for patient to not need shots as family was not comfortable providing shots. Referral was placed to [address] [place] [address] via [address] They will follow with patient and family after discharge. Patient was cleared by therapy. No other home care needs. Daughter to transport.      The Plan for Transition of Care is related to the following treatment goals DVT, lower extremity, proximal, acute, unspecified laterality ([facility] [I82.4Y9]      The Patient and/or patient representative Patient/Daughter was provided with a choice of provider and agrees with the discharge plan Yes    Freedom of choice list was provided with basic dialogue that supports the patient's individualized plan of care/goals and shares the quality data associated with the providers.  Yes    Care Transitions patient: No    BINA Mckeon  [facility]   Case Management Department  Ph: [phone]

## 2021-04-15 ENCOUNTER — TELEPHONE (OUTPATIENT)
Dept: INTERNAL MEDICINE CLINIC | Age: 81
End: 2021-04-15

## 2021-04-15 NOTE — DISCHARGE SUMMARY
INTERNAL MEDICINE DEPARTMENT AT [address] [place] [address]  DISCHARGE SUMMARY    Patient ID: Bre Lamb                                             Discharge Date: 4/15/2021   Patient's PCP: Forrest Silvestre MD                                          Discharge Physician: Monroe Osei Date: 4/12/2021   Admitting Physician: Forrest Silvestre MD      DISCHARGE DIAGNOSES:  1-left femoral DVT  2-HTN  3-pancreatic adenocarcinoma  Hospital Course:    Patient is a 80 y.o. female  with PMHx of breast cancer with mets to liver, and pancreas, HTN who presented to [facility]. from OSH due to lower extremity pain that she started experiencing while getting acupuncture on Saturday. Family contacted acupuncturists and had her get a lower extremity ultrasound that found a totally occlusive DVT in her left femoral vein, and totally occlusive DVT in the right popliteal vein. In the hospital she was started on therapeutic Lovenox, vascular surgery was consulted but recommended that she continue medical management. During her history of metastatic cancer and declining any adjuvant therapy, palliative care was consulted to follow-up outpatient and transition to hospice in the future. On the day of discharge patient was transitioned from Lovenox to Eliquis, she is to follow-up with her PCP in 1 week's time. Patient understands and agrees to plan, medication, and recommendations. Physical Exam:  /67   Pulse 80   Temp 98.2 °F (36.8 °C) (Oral)   Resp 16   Ht 5' 2\" (1.575 m)   Wt 126 lb 1.7 oz (57.2 kg)   SpO2 94%   BMI 23.06 kg/m²   General appearance: alert, appears stated age and cooperative  Head: Normocephalic, without obvious abnormality, atraumatic  Eyes: conjunctivae/corneas clear. PERRL, EOM's intact. Fundi benign. Ears: normal TM's and external ear canals both ears  Nose: Nares normal. Septum midline. Mucosa normal. No drainage or sinus tenderness.   Throat: lips, mucosa, and tongue normal; teeth and gums

## 2021-04-15 NOTE — TELEPHONE ENCOUNTER
Daughter called stating Eliquis script that was sent over from the resident is not covered on Medicaid, but Dr. Mendez Mention, is can a new script be sent. Had to pay out of pocket for 4 pills ($40) last night. I sent both prescriptions to the pharmacy.

## 2021-04-16 ENCOUNTER — VIRTUAL VISIT (OUTPATIENT)
Dept: INTERNAL MEDICINE CLINIC | Age: 81
End: 2021-04-16
Payer: MEDICAID

## 2021-04-16 DIAGNOSIS — I82.403 ACUTE DEEP VEIN THROMBOSIS (DVT) OF BOTH LOWER EXTREMITIES, UNSPECIFIED VEIN (HCC): Primary | ICD-10-CM

## 2021-04-16 DIAGNOSIS — C25.9 MALIGNANT NEOPLASM OF PANCREAS, UNSPECIFIED LOCATION OF MALIGNANCY (HCC): ICD-10-CM

## 2021-04-16 PROCEDURE — 99213 OFFICE O/P EST LOW 20 MIN: CPT | Performed by: INTERNAL MEDICINE

## 2021-04-16 ASSESSMENT — PATIENT HEALTH QUESTIONNAIRE - PHQ9
SUM OF ALL RESPONSES TO PHQ QUESTIONS 1-9: 2
SUM OF ALL RESPONSES TO PHQ QUESTIONS 1-9: 2

## 2021-04-16 NOTE — PROGRESS NOTES
2021    TELEHEALTH EVALUATION -- Audio/Visual (During HONNQ-40 public health emergency)    HPI: Follow-up bilateral DVTs    Josh Shi (:  1940) has requested an audio/video evaluation for the following concern(s):  Follow-up of a DVT she is now on Eliquis she is doing well she is able to ambulate she otherwise is considering hospice therapy versus alternative medicine    Review of Systems no fever chills shortness of breath    Prior to Visit Medications    Medication Sig Taking?  Authorizing Provider   apixaban (ELIQUIS) 5 MG TABS tablet Take 2 tablets by mouth 2 times daily for 7 days Yes Mary Ann Chauhan MD   bisoprolol-hydroCHLOROthiazide [facility]) 2.5-6.25 MG per tablet Take 1 tablet by mouth 2 times daily Yes Michelle Andersen MD   latanoprost (XALATAN) 0.005 % ophthalmic solution 1 drop nightly Yes Historical Provider, MD   vitamin D (ERGOCALCIFEROL) 97480 UNITS CAPS capsule TAKE ONE CAPSULE BY MOUTH ONCE WEEKLY  Patient taking differently: every 14 days  Yes Dl Kelly APRN - CNP   apixaban (ELIQUIS) 5 MG TABS tablet Take 1 tablet by mouth 2 times daily  Patient not taking: Reported on 2021  Mary Ann Chauhan MD   diclofenac (VOLTAREN) 75 MG EC tablet TAKE ONE TABLET BY MOUTH TWICE A DAY WITH MEALS  Patient not taking: Reported on 2021  Mary Ann Chauhan MD       Social History     Tobacco Use    Smoking status: Never Smoker    Smokeless tobacco: Never Used   Substance Use Topics    Alcohol use: No    Drug use: Not Currently        Past Medical History:   Diagnosis Date    Breast cancer ([facility] 2012    Diarrhea 2012    Glaucoma 4/15/2014    H/O: hysterectomy 2012    History of thyroiditis 2012    HTN (hypertension) 2012    Hypothyroidism     IFG (impaired fasting glucose)     Iron deficiency     Liver cyst 2012    Malignant neoplasm of head of pancreas ([facility] 2021    Thalassemia minor     Tonsil stone     Vertigo 2018    Vitamin D deficiency 4/18/2014       PHYSICAL EXAMINATION:  [ INSTRUCTIONS:  \"[x]\" Indicates a positive item  \"[]\" Indicates a negative item  -- DELETE ALL ITEMS NOT EXAMINED]  Vital Signs: (As obtained by patient/caregiver or practitioner observation)    Blood pressure-  Heart rate-    Respiratory rate-    Temperature-  Pulse oximetry-     Constitutional: [x] Appears well-developed and well-nourished [x] No apparent distress      [] Abnormal-   Mental status  [x] Alert and awake  [] Oriented to person/place/time []Able to follow commands      Eyes:  EOM    []  Normal  [] Abnormal-  Sclera  []  Normal  [] Abnormal -         Discharge []  None visible  [] Abnormal -    HENT:   [] Normocephalic, atraumatic. [] Abnormal   [] Mouth/Throat: Mucous membranes are moist.     External Ears [] Normal  [] Abnormal-     Neck: [] No visualized mass     Pulmonary/Chest: [x] Respiratory effort normal.  [x] No visualized signs of difficulty breathing or respiratory distress        [] Abnormal-      Musculoskeletal:   [] Normal gait with no signs of ataxia         [] Normal range of motion of neck        [] Abnormal-       Neurological:        [] No Facial Asymmetry (Cranial nerve 7 motor function) (limited exam to video visit)          [] No gaze palsy        [] Abnormal-         Skin:        [] No significant exanthematous lesions or discoloration noted on facial skin         [] Abnormal-            Psychiatric:       [x] Normal Affect [x] No Hallucinations        [] Abnormal-     Other pertinent observable physical exam findings-     ASSESSMENT/PLAN:  Bilateral DVTs now on Eliquis and stable continue present meds follow-up in 2 weeks in the office  Still is hesitant to start chemotherapy with regard to her pancreatic cancer  No follow-ups on file. Wes Sanders, was evaluated through a synchronous (real-time) audio-video encounter. The patient (or guardian if applicable) is aware that this is a billable service.  Verbal consent to proceed has been obtained within the past 12 months. The visit was conducted pursuant to the emergency declaration under the [address] [place] [address] and the [employer] Act. Patient identification was verified, and a caregiver was present when appropriate. The patient was located in a state where the provider was credentialed to provide care. Total time spent on this encounter: Not billed by time    --Lolita Cruz MD on 4/16/2021 at 4:24 PM    An electronic signature was used to authenticate this note.

## 2021-04-23 ENCOUNTER — TELEPHONE (OUTPATIENT)
Dept: INTERNAL MEDICINE CLINIC | Age: 81
End: 2021-04-23

## 2021-04-23 DIAGNOSIS — C25.1 MALIGNANT NEOPLASM OF BODY OF PANCREAS (HCC): Primary | ICD-10-CM

## 2021-04-23 RX ORDER — ACETAMINOPHEN AND CODEINE PHOSPHATE 120; 12 MG/5ML; MG/5ML
5 SOLUTION ORAL EVERY 6 HOURS PRN
Qty: 120 ML | Refills: 0 | Status: SHIPPED | OUTPATIENT
Start: 2021-04-23 | End: 2021-04-29

## 2021-04-23 NOTE — TELEPHONE ENCOUNTER
Requesting something mild for pain in liquid form. Patient is experiencing more pain in upper right side of abdomen. She has just started experiencing sob with walking(exertion). Her legs are cramping and feel very weak. Tylenol with codeine liquid 5cc every 6 hours    Make sure you are taking the blood thinning medicine.

## 2021-04-30 ENCOUNTER — TELEPHONE (OUTPATIENT)
Dept: INTERNAL MEDICINE CLINIC | Age: 81
End: 2021-04-30

## 2021-04-30 NOTE — TELEPHONE ENCOUNTER
As needed for nausea    Daughter has been notified. Patient had an episode of  difficulty signing her signature.

## 2021-04-30 NOTE — TELEPHONE ENCOUNTER
Joyce Clements called to see if Dr. Phillip Montgomery still wants the patient to take ondansetron [facility]) injection 4 mg after being discharged from hospital.         Please call and advise.

## 2021-05-04 ENCOUNTER — TELEPHONE (OUTPATIENT)
Dept: INTERNAL MEDICINE CLINIC | Age: 81
End: 2021-05-04

## 2021-05-04 DIAGNOSIS — R52 PAIN: ICD-10-CM

## 2021-05-04 DIAGNOSIS — C25.9 MALIGNANT NEOPLASM OF PANCREAS, UNSPECIFIED LOCATION OF MALIGNANCY (HCC): Primary | ICD-10-CM

## 2021-05-04 NOTE — TELEPHONE ENCOUNTER
Roxanne with [facility] called and wants to know if they can get an order for [facility] lax? She also wants to know if they can have perimeters for blood pressure medication and can you order something for pain, patient states she is having pain in her legs. Please call to advise.       [facility] [address], [address]  [facility] - F [phone]

## 2021-05-05 ENCOUNTER — TELEPHONE (OUTPATIENT)
Dept: INTERNAL MEDICINE CLINIC | Age: 81
End: 2021-05-05

## 2021-05-05 RX ORDER — BISOPROLOL FUMARATE AND HYDROCHLOROTHIAZIDE 2.5; 6.25 MG/1; MG/1
1 TABLET ORAL DAILY
Qty: 30 TABLET | Refills: 3 | Status: SHIPPED
Start: 2021-05-05

## 2021-05-05 RX ORDER — TRAMADOL HYDROCHLORIDE 50 MG/1
50 TABLET ORAL EVERY 6 HOURS PRN
Qty: 28 TABLET | Refills: 0 | Status: SHIPPED | OUTPATIENT
Start: 2021-05-05 | End: 2021-05-12

## 2021-05-05 NOTE — TELEPHONE ENCOUNTER
Daughter c/o low blood pressure feeling weak   Taking bisoprolol BID reradings 110//55 and 115/69  Should she continue BID or go down to 1 daily  Constipation - took 2 enemas yesterday and no stool out can she take miralax?

## 2021-05-07 ENCOUNTER — VIRTUAL VISIT (OUTPATIENT)
Dept: INTERNAL MEDICINE CLINIC | Age: 81
End: 2021-05-07
Payer: MEDICAID

## 2021-05-07 ENCOUNTER — TELEPHONE (OUTPATIENT)
Dept: INTERNAL MEDICINE CLINIC | Age: 81
End: 2021-05-07

## 2021-05-07 DIAGNOSIS — I82.4Y9 DVT, LOWER EXTREMITY, PROXIMAL, ACUTE, UNSPECIFIED LATERALITY (HCC): Primary | ICD-10-CM

## 2021-05-07 DIAGNOSIS — C25.0 MALIGNANT NEOPLASM OF HEAD OF PANCREAS (HCC): ICD-10-CM

## 2021-05-07 PROCEDURE — 99213 OFFICE O/P EST LOW 20 MIN: CPT | Performed by: INTERNAL MEDICINE

## 2021-05-07 ASSESSMENT — ENCOUNTER SYMPTOMS
COUGH: 0
SHORTNESS OF BREATH: 0
CHEST TIGHTNESS: 0
ABDOMINAL PAIN: 0
NAUSEA: 0
EYE REDNESS: 0
BACK PAIN: 0

## 2021-05-07 NOTE — TELEPHONE ENCOUNTER
Pt daughter Simi Atkins called stating that pt having bilateral foot pain. Pinky toe nails on both feet looks gray in color. Both soles have red spots. Pt is unable to stand and is highly uncomfortable laying down. Legs are cold to touch. Pt is known to have clots in her legs. Rosi pressed above knee and below blanching skin  and states it took just over 45 sec. L leg does not change. Simi Atkins also notices that when she presses on pt leg finger indentation stays (edema?). Pt complaining of tingling/numbness in feet. Wants to give pt Tylenol because she has no other option. Encouraged to go to ER but daughter refuses and wants Dr. Magnolia Lema to respond ASAP.   Please advise

## 2021-05-07 NOTE — PROGRESS NOTES
tablet 0    vitamin D (ERGOCALCIFEROL) 34909 UNITS CAPS capsule TAKE ONE CAPSULE BY MOUTH ONCE WEEKLY (Patient not taking: No sig reported) 4 capsule 2     No current facility-administered medications on file prior to visit. Allergies   Allergen Reactions    Latex Rash    Ace Inhibitors Other (See Comments)     Cough       Review of Systems   Constitutional: Positive for fatigue. Negative for fever and unexpected weight change. HENT: Negative for congestion and hearing loss. Eyes: Negative for redness and visual disturbance. Respiratory: Negative for cough, chest tightness and shortness of breath. Cardiovascular: Negative for chest pain and leg swelling. Gastrointestinal: Negative for abdominal pain and nausea. Endocrine: Negative for polydipsia and polyuria. Genitourinary: Negative for dysuria and frequency. Musculoskeletal: Negative for arthralgias, back pain and neck pain. Bilateral leg pain    Skin: Negative for rash and wound. Neurological: Positive for weakness. Negative for dizziness and headaches. Hematological: Negative for adenopathy. Does not bruise/bleed easily. Psychiatric/Behavioral: Positive for confusion (daughter has noted some confusion in the last week. ). Negative for sleep disturbance. The patient is not nervous/anxious. Objective:   Physical Exam  Constitutional:       Comments: Thin elderly woman    Eyes:      Pupils: Pupils are equal, round, and reactive to light. Pulmonary:      Breath sounds: Normal breath sounds. No wheezing or rales. Skin:     Comments: Irregular purplish patches on her feet, legs feel cool, she complains of numbness and tingling. Venous stasis discoloration vs bruising in her lower extremities. Assessment and plan       1. DVT, lower extremity, proximal, acute, unspecified laterality (HCC)  Increased leg pain with slight discoloration and cool feeling of the legs. Known DVT of the lower extremities.   Pain has been better with Tylenol. Continue Tylenol and have visiting nurse check her extremities. Consider emergency room visit if pain and/or swelling becomes or significant. 2. Malignant neoplasm of head of pancreas ([facility]  New diagnosis of metastatic pancreatic cancer. Follow-up with oncology. Chirag Mcleod, was evaluated through a synchronous (real-time) audio-video encounter. The patient (or guardian if applicable) is aware that this is a billable service. Verbal consent to proceed has been obtained within the past 12 months. The visit was conducted pursuant to the emergency declaration under the [address] [place] [address], [address] [place] [address] authority and the [employer] and [employer] General Act. Patient identification was verified, and a caregiver was present when appropriate. The patient was located in a state where the provider was credentialed to provide care. Total time spent for this encounter: Not billed by time    --Mahsa Manning MD on 5/7/2021 at 4:28 PM    An electronic signature was used to authenticate this note.

## 2021-05-11 ENCOUNTER — TELEPHONE (OUTPATIENT)
Dept: INTERNAL MEDICINE CLINIC | Age: 81
End: 2021-05-11

## 2021-05-11 NOTE — TELEPHONE ENCOUNTER
Daughter calling to speak to Provider regarding her mother who is currently in hospital at [facility]. Has questions about meds, etc.    Daughter is trying to get chart on CareEverywhere so that provider can see notes.     [phone] pt phone

## 2021-05-12 ENCOUNTER — TELEPHONE (OUTPATIENT)
Dept: INTERNAL MEDICINE CLINIC | Age: 81
End: 2021-05-12

## 2021-05-12 NOTE — TELEPHONE ENCOUNTER
Patient daughter called back to let you know her mother's blood pressure today was 156/38 and yesterday it was 135/26. They don't want to give her any blood pressure medication sine her numbers are low and a possible GI bleed. Patient is still in the hospital,though. She just wants Dr. Missy Hayward opinion. Please call to advise.

## 2021-05-14 ENCOUNTER — TELEPHONE (OUTPATIENT)
Dept: INTERNAL MEDICINE CLINIC | Age: 81
End: 2021-05-14

## 2021-05-14 NOTE — TELEPHONE ENCOUNTER
Do Martinez from [facility] will fax over death certificate to be signed. Pt passed @ home under hospice care @ [phone] on 05/13/21.  Also [facility] will be faxing over C2 request for Dr. Missy Hayward to sign

## 2021-06-09 ENCOUNTER — TELEPHONE (OUTPATIENT)
Dept: INTERNAL MEDICINE CLINIC | Age: 81
End: 2021-06-09